# Patient Record
Sex: FEMALE | Race: WHITE | NOT HISPANIC OR LATINO | Employment: FULL TIME | URBAN - METROPOLITAN AREA
[De-identification: names, ages, dates, MRNs, and addresses within clinical notes are randomized per-mention and may not be internally consistent; named-entity substitution may affect disease eponyms.]

---

## 2017-01-03 ENCOUNTER — TRANSFERRED RECORDS (OUTPATIENT)
Dept: HEALTH INFORMATION MANAGEMENT | Facility: CLINIC | Age: 42
End: 2017-01-03

## 2018-12-17 ENCOUNTER — OFFICE VISIT (OUTPATIENT)
Dept: FAMILY MEDICINE | Facility: CLINIC | Age: 43
End: 2018-12-17
Payer: COMMERCIAL

## 2018-12-17 VITALS
TEMPERATURE: 98.2 F | SYSTOLIC BLOOD PRESSURE: 119 MMHG | WEIGHT: 236.31 LBS | HEART RATE: 87 BPM | HEIGHT: 66 IN | BODY MASS INDEX: 37.98 KG/M2 | OXYGEN SATURATION: 97 % | RESPIRATION RATE: 14 BRPM | DIASTOLIC BLOOD PRESSURE: 79 MMHG

## 2018-12-17 DIAGNOSIS — B37.31 CANDIDIASIS OF VAGINA: ICD-10-CM

## 2018-12-17 DIAGNOSIS — Z12.4 SCREENING FOR MALIGNANT NEOPLASM OF CERVIX: ICD-10-CM

## 2018-12-17 DIAGNOSIS — Z00.00 PREVENTATIVE HEALTH CARE: Primary | ICD-10-CM

## 2018-12-17 DIAGNOSIS — B37.9 CANDIDIASIS: ICD-10-CM

## 2018-12-17 LAB
CHOLEST SERPL-MCNC: 228 MG/DL
ERYTHROCYTE [DISTWIDTH] IN BLOOD BY AUTOMATED COUNT: 12.9 % (ref 10–15)
FERRITIN SERPL-MCNC: 89 NG/ML (ref 12–150)
HBA1C MFR BLD: 5.1 % (ref 0–5.6)
HCT VFR BLD AUTO: 42.1 % (ref 35–47)
HDLC SERPL-MCNC: 39 MG/DL
HGB BLD-MCNC: 14.1 G/DL (ref 11.7–15.7)
LDLC SERPL CALC-MCNC: 152 MG/DL
MCH RBC QN AUTO: 30.2 PG (ref 26.5–33)
MCHC RBC AUTO-ENTMCNC: 33.5 G/DL (ref 31.5–36.5)
MCV RBC AUTO: 90 FL (ref 78–100)
NONHDLC SERPL-MCNC: 189 MG/DL
PLATELET # BLD AUTO: 304 10E9/L (ref 150–450)
RBC # BLD AUTO: 4.67 10E12/L (ref 3.8–5.2)
TRIGL SERPL-MCNC: 187 MG/DL
TSH SERPL DL<=0.005 MIU/L-ACNC: 1.28 MU/L (ref 0.4–4)
WBC # BLD AUTO: 9.3 10E9/L (ref 4–11)

## 2018-12-17 PROCEDURE — 82728 ASSAY OF FERRITIN: CPT | Performed by: FAMILY MEDICINE

## 2018-12-17 PROCEDURE — 83036 HEMOGLOBIN GLYCOSYLATED A1C: CPT | Performed by: FAMILY MEDICINE

## 2018-12-17 PROCEDURE — 80061 LIPID PANEL: CPT | Performed by: FAMILY MEDICINE

## 2018-12-17 PROCEDURE — 87624 HPV HI-RISK TYP POOLED RSLT: CPT | Performed by: FAMILY MEDICINE

## 2018-12-17 PROCEDURE — 99396 PREV VISIT EST AGE 40-64: CPT | Performed by: FAMILY MEDICINE

## 2018-12-17 PROCEDURE — 36415 COLL VENOUS BLD VENIPUNCTURE: CPT | Performed by: FAMILY MEDICINE

## 2018-12-17 PROCEDURE — 84443 ASSAY THYROID STIM HORMONE: CPT | Performed by: FAMILY MEDICINE

## 2018-12-17 PROCEDURE — G0145 SCR C/V CYTO,THINLAYER,RESCR: HCPCS | Performed by: FAMILY MEDICINE

## 2018-12-17 PROCEDURE — 85027 COMPLETE CBC AUTOMATED: CPT | Performed by: FAMILY MEDICINE

## 2018-12-17 RX ORDER — CLOTRIMAZOLE 1 G/ML
SOLUTION TOPICAL 2 TIMES DAILY
Qty: 60 ML | Refills: 1 | Status: SHIPPED | OUTPATIENT
Start: 2018-12-17 | End: 2019-05-31

## 2018-12-17 RX ORDER — FLUCONAZOLE 150 MG/1
150 TABLET ORAL ONCE
Qty: 1 TABLET | Refills: 0 | Status: SHIPPED | OUTPATIENT
Start: 2018-12-17 | End: 2019-05-31

## 2018-12-17 ASSESSMENT — MIFFLIN-ST. JEOR: SCORE: 1748.66

## 2018-12-17 ASSESSMENT — PAIN SCALES - GENERAL: PAINLEVEL: NO PAIN (0)

## 2018-12-17 NOTE — NURSING NOTE
"Chief Complaint   Patient presents with     Physical     /79 (BP Location: Right arm, Patient Position: Sitting, Cuff Size: Adult Large)   Pulse 87   Temp 98.2  F (36.8  C) (Oral)   Resp 14   Ht 1.676 m (5' 6\")   Wt 107.2 kg (236 lb 5 oz)   LMP 12/17/2018   SpO2 97%   BMI 38.14 kg/m   Estimated body mass index is 38.14 kg/m  as calculated from the following:    Height as of this encounter: 1.676 m (5' 6\").    Weight as of this encounter: 107.2 kg (236 lb 5 oz).  bp completed using cuff size: large      All.CARITO Esposito                "

## 2018-12-17 NOTE — PROGRESS NOTES
SUBJECTIVE:   CC: Delia Gunn is an 43 year old woman who presents for preventive health visit.     Physical   Annual:     Getting at least 3 servings of Calcium per day:  Yes    Bi-annual eye exam:  NO    Dental care twice a year:  Yes    Sleep apnea or symptoms of sleep apnea:  Daytime drowsiness    Diet:  Regular (no restrictions)    Frequency of exercise:  1 day/week    Duration of exercise:  15-30 minutes    Taking medications regularly:  Yes    Medication side effects:  Not applicable    Additional concerns today:  Yes    PHQ-2 Total Score: 0    The patient is concerned about vaginal and anal symptoms.  She has been using tampon during her menstrual cycle but noticed that the tampon breaks into smaller pieces.  She recently received a message from mother stating that the tampon have been recalled.    Reports itching around the vaginal area as well as the anal area.  Medical history significant for vulvar carcinoma s/p resection.  Patient was was in her 20s at that time.    Also reports excessive fatigue.  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  Unsure if the shift change is causing her fatigue.  Sleeps well at night.  Denies any vaginal bleeding or rectal bleeding.    Today's PHQ-2 Score:   PHQ-2 ( 1999 Pfizer) 12/17/2018   Q1: Little interest or pleasure in doing things 0   Q2: Feeling down, depressed or hopeless 0   PHQ-2 Score 0   Q1: Little interest or pleasure in doing things Not at all   Q2: Feeling down, depressed or hopeless Not at all   PHQ-2 Score 0       Abuse: Current or Past(Physical, Sexual or Emotional)- No  Do you feel safe in your environment? Yes    Social History     Tobacco Use     Smoking status: Never Smoker     Smokeless tobacco: Never Used   Substance Use Topics     Alcohol use: Yes     Alcohol/week: 0.0 oz     Comment: wine on weekends     Alcohol Use 12/17/2018   If you drink alcohol do you typically have greater than 3 drinks per day OR greater than  "7 drinks per week? No   No flowsheet data found.    Reviewed orders with patient.  Reviewed health maintenance and updated orders accordingly - Yes    Mammogram not appropriate for this patient based on age.    Pertinent mammograms are reviewed under the imaging tab.  History of abnormal Pap smear: YES - other categories - see link Cervical Cytology Screening Guidelines  PAP / HPV Latest Ref Rng & Units 4/20/2016 8/19/2014 9/28/2012   PAP - NIL NIL NIL   HPV 16 DNA NEG Negative - -   HPV 18 DNA NEG Negative - -   OTHER HR HPV NEG Negative - -     Reviewed and updated as needed this visit by clinical staff  Tobacco  Allergies  Meds         Review of Systems  CONSTITUTIONAL: NEGATIVE for fever, chills, change in weight  INTEGUMENTARU/SKIN: NEGATIVE for worrisome rashes, moles or lesions  EYES: NEGATIVE for vision changes or irritation  ENT: NEGATIVE for ear, mouth and throat problems  RESP: NEGATIVE for significant cough or SOB  BREAST: NEGATIVE for masses, tenderness or discharge  CV: NEGATIVE for chest pain, palpitations or peripheral edema  GI: NEGATIVE for nausea, abdominal pain, heartburn, or change in bowel habits  : NEGATIVE for unusual urinary or vaginal symptoms. Periods are regular.  MUSCULOSKELETAL: NEGATIVE for significant arthralgias or myalgia  NEURO: NEGATIVE for weakness, dizziness or paresthesias  PSYCHIATRIC: NEGATIVE for changes in mood or affect     OBJECTIVE:   /79 (BP Location: Right arm, Patient Position: Sitting, Cuff Size: Adult Large)   Pulse 87   Temp 98.2  F (36.8  C) (Oral)   Resp 14   Ht 1.676 m (5' 6\")   Wt 107.2 kg (236 lb 5 oz)   LMP 12/17/2018   SpO2 97%   BMI 38.14 kg/m    Physical Exam  GENERAL: healthy, alert and no distress  EYES: Eyes grossly normal to inspection, PERRL and conjunctivae and sclerae normal  HENT: ear canals and TM's normal, nose and mouth without ulcers or lesions  NECK: no adenopathy, no asymmetry, masses, or scars and thyroid normal to " palpation  RESP: lungs clear to auscultation - no rales, rhonchi or wheezes  BREAST: normal without masses, tenderness or nipple discharge and no palpable axillary masses or adenopathy  CV: regular rate and rhythm, normal S1 S2, no S3 or S4, no murmur, click or rub, no peripheral edema and peripheral pulses strong  ABDOMEN: soft, nontender, no hepatosplenomegaly, no masses and bowel sounds normal   (female): normal female external genitalia, normal urethral meatus, moderate thick clumpy vaginal discharge.    Skin around the anal area shows no maceration of the skin consistent with cutaneous candidiasis.  Patient also has a laceration at the 4 o'clock position which can be secondary to candidiasis or straining.  MS: no gross musculoskeletal defects noted, no edema  SKIN: no suspicious lesions or rashes  NEURO: Normal strength and tone, mentation intact and speech normal  PSYCH: mentation appears normal, affect normal/bright        ASSESSMENT/PLAN:       ICD-10-CM    1. Preventative health care Z00.00 TSH with free T4 reflex     CBC with platelets     Ferritin     Lipid panel reflex to direct LDL Fasting     Hemoglobin A1c   2. Candidiasis B37.9 clotrimazole (LOTRIMIN) 1 % external solution   3. Candidiasis of vagina B37.3 fluconazole (DIFLUCAN) 150 MG tablet   4. Screening for malignant neoplasm of cervix Z12.4 Pap imaged thin layer screen with HPV - recommended age 30 - 65 years (select HPV order below)     HPV High Risk Types DNA Cervical     Advised to return to clinic for evaluation if there itching or anal symptoms continues.  Will do wet prep at that time.  Patient declined wet prep today.    COUNSELING:  Reviewed preventive health counseling, as reflected in patient instructions       Regular exercise       Healthy diet/nutrition    Counseling Resources:  ATP IV Guidelines  Pooled Cohorts Equation Calculator  Breast Cancer Risk Calculator  FRAX Risk Assessment  ICSI Preventive Guidelines  Dietary Guidelines  for Americans, 2010  USDA's MyPlate  ASA Prophylaxis  Lung CA Screening    Tg Soni MD  [facility]

## 2018-12-17 NOTE — LETTER
January 3, 2019    Delia Gunn  [address] [address]    Dear Delia,  We are happy to inform you that your PAP smear result from 12/17/18 is normal.  We are now able to do a follow up test on PAP smears. The DNA test is for HPV (Human Papilloma Virus). Cervical cancer is closely linked with certain types of HPV. Your results showed no evidence of high risk HPV.  Therefore we recommend you return in 5 years for your next pap smear and HPV test.  You will still need to return to the clinic every year for an annual exam and other preventive tests.  If you have additional questions regarding this result, please call our registered nurse, Kaila at [phone].  Sincerely,    Tg Soni MD/[facility]

## 2018-12-19 NOTE — RESULT ENCOUNTER NOTE
Please call the patient with the results.     Dear Delia,   All of your results are normal except for your cholesterol panel.     LDL(bad) cholesterol level is elevated, HDL(good) cholesterol level is low and your triglycerides are elevated which can increase your heart disease risk.  A diet high in fat and simple carbohydrates, genetics and being overweight can contribute to this. ADVISE: exercising 150 minutes of aerobic exercise per week (30 minutes for 5 days per week or 50 minutes for 3 days per week are options), eating a low saturated fat/low carbohydrate diet, and omega-3 fatty acids (fish oil) 7325-6178 mg daily are helpful to improve this. Comparing your current results to your results from 2 years ago, we can see a significant increase.      In 6 months, you should recheck your fasting cholesterol panel by scheduling a lab-only appointment.      Tg Soni MD

## 2018-12-20 LAB
COPATH REPORT: NORMAL
PAP: NORMAL

## 2018-12-21 LAB
FINAL DIAGNOSIS: NORMAL
HPV HR 12 DNA CVX QL NAA+PROBE: NEGATIVE
HPV16 DNA SPEC QL NAA+PROBE: NEGATIVE
HPV18 DNA SPEC QL NAA+PROBE: NEGATIVE
SPECIMEN DESCRIPTION: NORMAL
SPECIMEN SOURCE CVX/VAG CYTO: NORMAL

## 2018-12-27 NOTE — RESULT ENCOUNTER NOTE
Remote hx of AIN and CURLY. The patient had cancer surveillance follow-up for a few years with normal results. I would recommend routine screening.       Tg Soni MD

## 2019-03-21 ENCOUNTER — TELEPHONE (OUTPATIENT)
Dept: FAMILY MEDICINE | Facility: CLINIC | Age: 44
End: 2019-03-21

## 2019-03-21 ENCOUNTER — NURSE TRIAGE (OUTPATIENT)
Dept: NURSING | Facility: CLINIC | Age: 44
End: 2019-03-21

## 2019-03-21 DIAGNOSIS — Z29.9 PROPHYLACTIC MEASURE: Primary | ICD-10-CM

## 2019-03-21 RX ORDER — OSELTAMIVIR PHOSPHATE 75 MG/1
75 CAPSULE ORAL DAILY
Qty: 10 CAPSULE | Refills: 0 | Status: SHIPPED | OUTPATIENT
Start: 2019-03-21 | End: 2019-05-31

## 2019-03-21 NOTE — TELEPHONE ENCOUNTER
FS,  Please see below message and advise.  See FNA note if needed also  Thanks,  Fouzia MEJIAS RN

## 2019-03-21 NOTE — TELEPHONE ENCOUNTER
Patient calling back requesting Tamiflu for Influenza. Has called earlier to request the medication and following up if a medication is ordered for her. Informed FNA nurse sent an urgent message to clinic requesting the medication. Informed there is no Tamiflu medication prescribed for patient.     Patient refused triage. States will call back tomorrow. Informed the option of trying [email or web address].    Caller verbalized understanding. Denies further questions.      Jovon Horvath RN  [facility]     Reason for Disposition    Caller requesting a NON-URGENT new prescription or refill and triager unable to refill per unit policy    Protocols used: MEDICATION QUESTION CALL-ADULT-

## 2019-03-21 NOTE — TELEPHONE ENCOUNTER
Patient reports both kids tested positive for the flu and she wants prescription for  Tamiflu.  Patient reports she has a cough and her teeth hurts, no fever yet.  Patient is an RN.  FNA advised will send message to [facility] to consider giving her Tamiflu.        Reason for Disposition    [1] Influenza EXPOSURE within last 72 hours (3 days) AND [2] exposed person is a health care worker, public health worker, or  (EMS)    Additional Information    Negative: Influenza has been diagnosed by a HCP    Negative: Influenza suspected (i.e., fever and respiratory symptoms; probable influenza exposure)    Negative: [1] Influenza EXPOSURE (close contact) within the last 7 days AND [2] fever or any respiratory symptoms (i.e., cough, runny or stuffy nose, sore throat)    Negative: [1] Cough AND [2] begins > 7 days after influenza EXPOSURE    Negative: [1] Runny or stuffy nose AND [2] begins > 7 days after influenza EXPOSURE    Negative: [1] Sore throat AND [2] begins > 7 days after influenza EXPOSURE    Negative: Patient sounds very sick or weak to the triager    Negative: Fever present > 3 days (72 hours)    Negative: [1] Influenza EXPOSURE (Close Contact) within last 72 hours (3 days) AND [2] exposed person is HIGH RISK (e.g., age > 64 years, pregnant, HIV+, chronic medical condition)    Protocols used: INFLUENZA EXPOSURE-ADULT-

## 2019-03-21 NOTE — TELEPHONE ENCOUNTER
Clinic Action Needed: yes, call back  FNA Triage Call  Presenting Problem:  Patient reports both kids tested positive for the flu and she wants prescription for  Tamiflu.  Patient reports she has a cough and her teeth hurts, no fever yet.  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      Guideline Used: flu exposure  Patient Recommendations/Teaching:  Routed to: RN Pool

## 2019-03-21 NOTE — TELEPHONE ENCOUNTER
Clinic Action Needed:Yes  Reason for Call: Patient checking back about getting Tamiflu prophylactically. She does have a cough that started today 3-21-19.  Cell phone [phone].  Uses [facility] [employer] in [place].  Routed to: [facility] triage  Kamilah Webb RN  [facility]

## 2019-03-21 NOTE — TELEPHONE ENCOUNTER
"Clinic Action Needed:Yes  Reason for Call: Patient checking back about getting Tamiflu prophylactically. She does have a cough that started today 3-21-19.  Cell phone [phone].  Uses [facility] pharmacy in [facility] in [place].  Routed to: [facility] triage  Kamilah Webb RN  [facility]      Reason for Disposition    [1] Request for URGENT new prescription or refill of \"essential\" medication (i.e., likelihood of harm to patient if not taken) AND [2] triager unable to fill per unit policy    Protocols used: MEDICATION QUESTION CALL-ADULT-      "

## 2019-05-31 ENCOUNTER — OFFICE VISIT (OUTPATIENT)
Dept: FAMILY MEDICINE | Facility: CLINIC | Age: 44
End: 2019-05-31
Payer: COMMERCIAL

## 2019-05-31 VITALS
WEIGHT: 227 LBS | OXYGEN SATURATION: 97 % | HEART RATE: 84 BPM | HEIGHT: 66 IN | RESPIRATION RATE: 12 BRPM | DIASTOLIC BLOOD PRESSURE: 78 MMHG | TEMPERATURE: 98.4 F | SYSTOLIC BLOOD PRESSURE: 115 MMHG | BODY MASS INDEX: 36.48 KG/M2

## 2019-05-31 DIAGNOSIS — J02.9 SORE THROAT: Primary | ICD-10-CM

## 2019-05-31 LAB
DEPRECATED S PYO AG THROAT QL EIA: NORMAL
SPECIMEN SOURCE: NORMAL

## 2019-05-31 PROCEDURE — 99213 OFFICE O/P EST LOW 20 MIN: CPT | Performed by: PHYSICIAN ASSISTANT

## 2019-05-31 PROCEDURE — 87081 CULTURE SCREEN ONLY: CPT | Performed by: PHYSICIAN ASSISTANT

## 2019-05-31 PROCEDURE — 87880 STREP A ASSAY W/OPTIC: CPT | Performed by: PHYSICIAN ASSISTANT

## 2019-05-31 ASSESSMENT — MIFFLIN-ST. JEOR: SCORE: 1701.42

## 2019-05-31 NOTE — NURSING NOTE
"Chief Complaint   Patient presents with     Pharyngitis     /78 (BP Location: Left arm, Patient Position: Sitting, Cuff Size: Adult Large)   Pulse 84   Temp 98.4  F (36.9  C) (Oral)   Resp 12   Ht 1.676 m (5' 6\")   Wt 103 kg (227 lb)   SpO2 97%   Breastfeeding? No   BMI 36.64 kg/m   Estimated body mass index is 36.64 kg/m  as calculated from the following:    Height as of this encounter: 1.676 m (5' 6\").    Weight as of this encounter: 103 kg (227 lb).  bp completed using cuff size: large       Health Maintenance addressed:  NONE    Possibly completing today per provider review.    Dustin Michael MA       "

## 2019-05-31 NOTE — PROGRESS NOTES
"Subjective     Delia Gunn is a 44 year old female who presents to clinic today for the following health issues:    HPI   RESPIRATORY SYMPTOMS      Duration: started a week ago     Description  sore throat and fatigue/malaise    Severity: moderate    Accompanying signs and symptoms: None    History (predisposing factors):  strep exposure at school (job)    Precipitating or alleviating factors: None    Therapies tried and outcome:  Cough drops, chloropeptic spray and salt water raises           BP Readings from Last 3 Encounters:   05/31/19 115/78   12/17/18 119/79   11/07/16 119/84    Wt Readings from Last 3 Encounters:   05/31/19 103 kg (227 lb)   12/17/18 107.2 kg (236 lb 5 oz)   11/07/16 91.6 kg (202 lb)                      Reviewed and updated as needed this visit by Provider         Review of Systems   ROS COMP: Constitutional, HEENT, cardiovascular, pulmonary, gi and gu systems are negative, except as otherwise noted.      Objective    /78 (BP Location: Left arm, Patient Position: Sitting, Cuff Size: Adult Large)   Pulse 84   Temp 98.4  F (36.9  C) (Oral)   Resp 12   Ht 1.676 m (5' 6\")   Wt 103 kg (227 lb)   SpO2 97%   Breastfeeding? No   BMI 36.64 kg/m    Body mass index is 36.64 kg/m .  Physical Exam   GENERAL: alert and no distress  EYES: Eyes grossly normal to inspection  HENT: ear canals and TM's normal, nose and mouth without ulcers or lesions  NECK: no adenopathy, no asymmetry, masses, or scars and thyroid normal to palpation  RESP: lungs clear to auscultation - no rales, rhonchi or wheezes  CV: regular rate and rhythm, normal S1 S2, no S3 or S4, no murmur, click or rub, no peripheral edema and peripheral pulses strong  PSYCH: mentation appears normal, affect normal/bright    Diagnostic Test Results:  Labs reviewed in Epic  Results for orders placed or performed in visit on 05/31/19 (from the past 24 hour(s))   Strep, Rapid Screen   Result Value Ref Range    Specimen Description " "Throat     Rapid Strep A Screen       NEGATIVE: No Group A streptococcal antigen detected by immunoassay, await culture report.           Assessment & Plan     1. Sore throat  Most likely viral vs allergies.  Supportive care.  - Strep, Rapid Screen  - Beta strep group A culture     BMI:   Estimated body mass index is 36.64 kg/m  as calculated from the following:    Height as of this encounter: 1.676 m (5' 6\").    Weight as of this encounter: 103 kg (227 lb).               No follow-ups on file.    Erik Pena PA-C  [facility]        "

## 2019-06-01 LAB
BACTERIA SPEC CULT: NORMAL
SPECIMEN SOURCE: NORMAL

## 2023-02-02 ENCOUNTER — HOSPITAL ENCOUNTER (OUTPATIENT)
Dept: MAMMOGRAPHY | Facility: CLINIC | Age: 48
Discharge: HOME | End: 2023-02-02
Attending: FAMILY MEDICINE | Admitting: FAMILY MEDICINE
Payer: COMMERCIAL

## 2023-02-02 DIAGNOSIS — Z12.31 VISIT FOR SCREENING MAMMOGRAM: ICD-10-CM

## 2023-02-02 PROCEDURE — 77067 SCR MAMMO BI INCL CAD: CPT

## 2023-02-22 ENCOUNTER — OFFICE VISIT (OUTPATIENT)
Dept: FAMILY MEDICINE | Facility: CLINIC | Age: 48
End: 2023-02-22
Payer: COMMERCIAL

## 2023-02-22 VITALS
DIASTOLIC BLOOD PRESSURE: 76 MMHG | HEIGHT: 65 IN | WEIGHT: 227 LBS | OXYGEN SATURATION: 100 % | BODY MASS INDEX: 37.82 KG/M2 | HEART RATE: 77 BPM | SYSTOLIC BLOOD PRESSURE: 111 MMHG | RESPIRATION RATE: 16 BRPM | TEMPERATURE: 97.7 F

## 2023-02-22 DIAGNOSIS — N89.8 VAGINAL LESION: ICD-10-CM

## 2023-02-22 DIAGNOSIS — D07.1 VIN III (VULVAR INTRAEPITHELIAL NEOPLASIA III): ICD-10-CM

## 2023-02-22 DIAGNOSIS — Z23 NEED FOR DIPHTHERIA-TETANUS-PERTUSSIS (TDAP) VACCINE: ICD-10-CM

## 2023-02-22 DIAGNOSIS — Z11.59 NEED FOR HEPATITIS C SCREENING TEST: ICD-10-CM

## 2023-02-22 DIAGNOSIS — Z00.00 ROUTINE GENERAL MEDICAL EXAMINATION AT A HEALTH CARE FACILITY: Primary | ICD-10-CM

## 2023-02-22 DIAGNOSIS — K62.82 ANAL INTRAEPITHELIAL NEOPLASIA II (AIN II): ICD-10-CM

## 2023-02-22 DIAGNOSIS — Z12.11 ENCOUNTER FOR SCREENING FOR MALIGNANT NEOPLASM OF COLON: ICD-10-CM

## 2023-02-22 DIAGNOSIS — E78.5 HYPERLIPIDEMIA LDL GOAL <100: ICD-10-CM

## 2023-02-22 DIAGNOSIS — Z12.11 SCREEN FOR COLON CANCER: ICD-10-CM

## 2023-02-22 DIAGNOSIS — Z12.4 SCREENING FOR MALIGNANT NEOPLASM OF CERVIX: ICD-10-CM

## 2023-02-22 DIAGNOSIS — L65.9 LOSS OF HAIR: ICD-10-CM

## 2023-02-22 LAB
ALBUMIN SERPL BCG-MCNC: 4.7 G/DL (ref 3.5–5.2)
ALP SERPL-CCNC: 59 U/L (ref 35–104)
ALT SERPL W P-5'-P-CCNC: 20 U/L (ref 10–35)
ANION GAP SERPL CALCULATED.3IONS-SCNC: 17 MMOL/L (ref 7–15)
AST SERPL W P-5'-P-CCNC: 23 U/L (ref 10–35)
BASOPHILS # BLD AUTO: 0 10E3/UL (ref 0–0.2)
BASOPHILS NFR BLD AUTO: 0 %
BILIRUB SERPL-MCNC: 0.6 MG/DL
BUN SERPL-MCNC: 9.6 MG/DL (ref 6–20)
CALCIUM SERPL-MCNC: 9.9 MG/DL (ref 8.6–10)
CHLORIDE SERPL-SCNC: 104 MMOL/L (ref 98–107)
CHOLEST SERPL-MCNC: 278 MG/DL
CREAT SERPL-MCNC: 0.78 MG/DL (ref 0.51–0.95)
DEPRECATED HCO3 PLAS-SCNC: 20 MMOL/L (ref 22–29)
EOSINOPHIL # BLD AUTO: 0.2 10E3/UL (ref 0–0.7)
EOSINOPHIL NFR BLD AUTO: 2 %
ERYTHROCYTE [DISTWIDTH] IN BLOOD BY AUTOMATED COUNT: 13.4 % (ref 10–15)
FERRITIN SERPL-MCNC: 153 NG/ML (ref 6–175)
GFR SERPL CREATININE-BSD FRML MDRD: >90 ML/MIN/1.73M2
GLUCOSE SERPL-MCNC: 88 MG/DL (ref 70–99)
HBA1C MFR BLD: 5.4 % (ref 0–5.6)
HCT VFR BLD AUTO: 42.8 % (ref 35–47)
HDLC SERPL-MCNC: 37 MG/DL
HGB BLD-MCNC: 14.7 G/DL (ref 11.7–15.7)
LDLC SERPL CALC-MCNC: 196 MG/DL
LYMPHOCYTES # BLD AUTO: 3 10E3/UL (ref 0.8–5.3)
LYMPHOCYTES NFR BLD AUTO: 36 %
MCH RBC QN AUTO: 30.2 PG (ref 26.5–33)
MCHC RBC AUTO-ENTMCNC: 34.3 G/DL (ref 31.5–36.5)
MCV RBC AUTO: 88 FL (ref 78–100)
MONOCYTES # BLD AUTO: 0.4 10E3/UL (ref 0–1.3)
MONOCYTES NFR BLD AUTO: 5 %
NEUTROPHILS # BLD AUTO: 4.6 10E3/UL (ref 1.6–8.3)
NEUTROPHILS NFR BLD AUTO: 57 %
NONHDLC SERPL-MCNC: 241 MG/DL
PLATELET # BLD AUTO: 345 10E3/UL (ref 150–450)
POTASSIUM SERPL-SCNC: 4.2 MMOL/L (ref 3.4–5.3)
PROT SERPL-MCNC: 7.4 G/DL (ref 6.4–8.3)
RBC # BLD AUTO: 4.87 10E6/UL (ref 3.8–5.2)
SODIUM SERPL-SCNC: 141 MMOL/L (ref 136–145)
TRIGL SERPL-MCNC: 224 MG/DL
TSH SERPL DL<=0.005 MIU/L-ACNC: 1.1 UIU/ML (ref 0.3–4.2)
VIT B12 SERPL-MCNC: 479 PG/ML (ref 232–1245)
WBC # BLD AUTO: 8.2 10E3/UL (ref 4–11)

## 2023-02-22 PROCEDURE — 84403 ASSAY OF TOTAL TESTOSTERONE: CPT | Performed by: FAMILY MEDICINE

## 2023-02-22 PROCEDURE — 80061 LIPID PANEL: CPT | Performed by: FAMILY MEDICINE

## 2023-02-22 PROCEDURE — G0145 SCR C/V CYTO,THINLAYER,RESCR: HCPCS | Performed by: FAMILY MEDICINE

## 2023-02-22 PROCEDURE — 80050 GENERAL HEALTH PANEL: CPT | Performed by: FAMILY MEDICINE

## 2023-02-22 PROCEDURE — 86803 HEPATITIS C AB TEST: CPT | Performed by: FAMILY MEDICINE

## 2023-02-22 PROCEDURE — 83036 HEMOGLOBIN GLYCOSYLATED A1C: CPT | Performed by: FAMILY MEDICINE

## 2023-02-22 PROCEDURE — 82306 VITAMIN D 25 HYDROXY: CPT | Performed by: FAMILY MEDICINE

## 2023-02-22 PROCEDURE — 86038 ANTINUCLEAR ANTIBODIES: CPT | Performed by: FAMILY MEDICINE

## 2023-02-22 PROCEDURE — 99213 OFFICE O/P EST LOW 20 MIN: CPT | Mod: 25 | Performed by: FAMILY MEDICINE

## 2023-02-22 PROCEDURE — 90471 IMMUNIZATION ADMIN: CPT | Performed by: FAMILY MEDICINE

## 2023-02-22 PROCEDURE — 99386 PREV VISIT NEW AGE 40-64: CPT | Mod: 25 | Performed by: FAMILY MEDICINE

## 2023-02-22 PROCEDURE — 82728 ASSAY OF FERRITIN: CPT | Performed by: FAMILY MEDICINE

## 2023-02-22 PROCEDURE — 90715 TDAP VACCINE 7 YRS/> IM: CPT | Performed by: FAMILY MEDICINE

## 2023-02-22 PROCEDURE — 82607 VITAMIN B-12: CPT | Performed by: FAMILY MEDICINE

## 2023-02-22 PROCEDURE — 87624 HPV HI-RISK TYP POOLED RSLT: CPT | Performed by: FAMILY MEDICINE

## 2023-02-22 PROCEDURE — 84270 ASSAY OF SEX HORMONE GLOBUL: CPT | Performed by: FAMILY MEDICINE

## 2023-02-22 PROCEDURE — 36415 COLL VENOUS BLD VENIPUNCTURE: CPT | Performed by: FAMILY MEDICINE

## 2023-02-22 ASSESSMENT — ENCOUNTER SYMPTOMS
ARTHRALGIAS: 0
HEMATOCHEZIA: 0
PARESTHESIAS: 0
FREQUENCY: 0
MYALGIAS: 0
HEADACHES: 0
SHORTNESS OF BREATH: 0
DYSURIA: 0
CHILLS: 0
COUGH: 0
ABDOMINAL PAIN: 0
JOINT SWELLING: 0
EYE PAIN: 0
BREAST MASS: 0
HEARTBURN: 0
FEVER: 0
SORE THROAT: 0
PALPITATIONS: 0
DIZZINESS: 0
NAUSEA: 0
WEAKNESS: 0
CONSTIPATION: 0
HEMATURIA: 0
NERVOUS/ANXIOUS: 0
DIARRHEA: 0

## 2023-02-22 NOTE — PROGRESS NOTES
SUBJECTIVE:   CC: Delia is an 47 year old who presents for preventive health visit.       Healthy Habits:     Getting at least 3 servings of Calcium per day:  Yes    Bi-annual eye exam:  NO    Dental care twice a year:  Yes    Sleep apnea or symptoms of sleep apnea:  None    Diet:  Regular (no restrictions)    Frequency of exercise:  1 day/week    Duration of exercise:  15-30 minutes    Taking medications regularly:  Not Applicable    Medication side effects:  Not applicable    PHQ-2 Total Score: 0    Additional concerns today:  No          Today's PHQ-2 Score:   PHQ-2 ( 1999 Pfizer) 2/22/2023   Q1: Little interest or pleasure in doing things 0   Q2: Feeling down, depressed or hopeless 0   PHQ-2 Score 0   PHQ-2 Total Score (12-17 Years)- Positive if 3 or more points; Administer PHQ-A if positive -   Q1: Little interest or pleasure in doing things Not at all   Q2: Feeling down, depressed or hopeless Not at all   PHQ-2 Score 0         Social History     Tobacco Use     Smoking status: Never     Smokeless tobacco: Never   Substance Use Topics     Alcohol use: Yes     Alcohol/week: 0.0 standard drinks     Comment: wine on weekends     If you drink alcohol do you typically have >3 drinks per day or >7 drinks per week? No    Alcohol Use 2/22/2023   Prescreen: >3 drinks/day or >7 drinks/week? No   Prescreen: >3 drinks/day or >7 drinks/week? -   No flowsheet data found.    Reviewed orders with patient.  Reviewed health maintenance and updated orders accordingly - Yes    Breast Cancer Screening:    FHS-7:   Breast CA Risk Assessment (FHS-7) 2/2/2023 2/22/2023   Did any of your first-degree relatives have breast or ovarian cancer? Yes Yes   Did any of your relatives have bilateral breast cancer? No No   Did any man in your family have breast cancer? No No   Did any woman in your family have breast and ovarian cancer? No No   Did any woman in your family have breast cancer before age 50 y? No No   Do you have 2 or more  "relatives with breast and/or ovarian cancer? No No   Do you have 2 or more relatives with breast and/or bowel cancer? No No       History of abnormal Pap smear: YES - other categories - see link Cervical Cytology Screening Guidelines.  Patient is high risk. Had AIN II and CURLY III. Needs to have annual pelvic exams.    PAP / HPV Latest Ref Rng & Units 12/17/2018 4/20/2016 8/19/2014   PAP (Historical) - NIL NIL NIL   HPV16 NEG:Negative Negative Negative -   HPV18 NEG:Negative Negative Negative -   HRHPV NEG:Negative Negative Negative -     Reviewed and updated as needed this visit by clinical staff   Tobacco  Allergies  Meds  Problems  Med Hx  Surg Hx  Fam Hx          Reviewed and updated as needed this visit by Provider   Tobacco  Allergies  Meds  Problems  Med Hx  Surg Hx  Fam Hx         Review of Systems   Constitutional: Negative for chills and fever.   HENT: Negative for congestion, ear pain, hearing loss and sore throat.    Eyes: Negative for pain and visual disturbance.   Respiratory: Negative for cough and shortness of breath.    Cardiovascular: Negative for chest pain, palpitations and peripheral edema.   Gastrointestinal: Negative for abdominal pain, constipation, diarrhea, heartburn, hematochezia and nausea.   Breasts:  Positive for tenderness. Negative for breast mass and discharge.   Genitourinary: Negative for dysuria, frequency, genital sores, hematuria, pelvic pain, urgency, vaginal bleeding and vaginal discharge.   Musculoskeletal: Negative for arthralgias, joint swelling and myalgias.   Skin: Negative for rash.   Neurological: Negative for dizziness, weakness, headaches and paresthesias.   Psychiatric/Behavioral: Negative for mood changes. The patient is not nervous/anxious.       OBJECTIVE:   /76   Pulse 77   Temp 97.7  F (36.5  C) (Temporal)   Resp 16   Ht 1.638 m (5' 4.5\")   Wt 103 kg (227 lb)   LMP 02/07/2023   SpO2 100%   BMI 38.36 kg/m    Physical Exam  GENERAL: " healthy, alert and no distress  EYES: Eyes grossly normal to inspection, PERRL and conjunctivae and sclerae normal  HENT: ear canals and TM's normal, nose and mouth without ulcers or lesions  NECK: no adenopathy, no asymmetry, masses, or scars and thyroid normal to palpation  RESP: lungs clear to auscultation - no rales, rhonchi or wheezes  BREAST: normal without masses, tenderness or nipple discharge and no palpable axillary masses or adenopathy  CV: regular rate and rhythm, normal S1 S2, no S3 or S4, no murmur, click or rub, no peripheral edema and peripheral pulses strong  ABDOMEN: soft, nontender, no hepatosplenomegaly, no masses and bowel sounds normal   (female): normal female external genitalia, normal urethral meatus, vaginal mucosa pink but  Has an irregular surface contour. hx of CURLY III and Anal intraepithelial neoplasia. needs a vaginoscopy/Colposcopy of the Vagina.  MS: no gross musculoskeletal defects noted, no edema  SKIN: no suspicious lesions or rashes  NEURO: Normal strength and tone, mentation intact and speech normal  PSYCH: mentation appears normal, affect normal/bright    Diagnostic Test Results:  Labs reviewed in Epic    ASSESSMENT/PLAN:     Delia was seen today for physical.    Diagnoses and all orders for this visit:    Routine general medical examination at a health care facility  -     Lipid panel reflex to direct LDL Fasting; Future  -     Hemoglobin A1c; Future    Screen for colon cancer    Need for hepatitis C screening test  -     Hepatitis C Screen Reflex to HCV RNA Quant and Genotype; Future      Anal intraepithelial neoplasia II (AIN II)  In 2011, the patient had a lesion in the anal area. bx by her OBGYN revealed a high-grade dysplasia.  She had a high resolution anoscopy by colorectal which revealed AIN-1.  She was treated with Aldara at that time.  No follow-up since then.  -Physical examination did not reveal any anal lesions at this time.    CURLY III (vulvar intraepithelial  neoplasia III)  Vaginal lesion  Initial evaluation was in 2003. vulvar lesion which was biopsied by Gyn Onc revealed  CURLY-III.  She underwent a local excision in February 2003.  In February 2004, she had an additional biopsy that showed CURLY-II and was placed on Aldara.  Vaginal examination today revealed multiple vaginal wall irregularities concerning for Vaginal intraepithelial neoplasia. Patient was referred to gyn for Vaginal Colposcopy and possible biopsy.     Need for diphtheria-tetanus-pertussis (Tdap) vaccine  -     TDAP VACCINE (Adacel, Boostrix)    Loss of hair  -     Anti Nuclear Marilee IgG by IFA with Reflex; Future  -     Testosterone Free and Total; Future  -     Vitamin B12; Future  -     Ferritin; Future  -     CBC with Platelets & Differential; Future  -     Comprehensive metabolic panel; Future  -     Vitamin D Deficiency; Future  -     TSH with free T4 reflex; Future    Encounter for screening for malignant neoplasm of colon  -     Colonoscopy Screening  Referral; Future    Screening for malignant neoplasm of cervix  -     Pap screen with HPV - recommended age 30 - 65 years    Other orders  -     REVIEW OF HEALTH MAINTENANCE PROTOCOL ORDERS    Patient has been advised of split billing requirements and indicates understanding: Yes      COUNSELING:  Reviewed preventive health counseling, as reflected in patient instructions       Regular exercise       Healthy diet/nutrition        She reports that she has never smoked. She has never used smokeless tobacco.          Tg Soni MD  [facility]

## 2023-02-23 LAB
ANA SER QL IF: NEGATIVE
DEPRECATED CALCIDIOL+CALCIFEROL SERPL-MC: 25 UG/L (ref 20–75)
HCV AB SERPL QL IA: NONREACTIVE
SHBG SERPL-SCNC: 27 NMOL/L (ref 30–135)

## 2023-02-24 LAB
TESTOST FREE SERPL-MCNC: 0.38 NG/DL
TESTOST SERPL-MCNC: 19 NG/DL (ref 8–60)

## 2023-02-24 NOTE — RESULT ENCOUNTER NOTE
Dear Delia,   - Your cholesterol panel is abnormal. Your LDL is significantly elevated.  To confirm the validity of the results, I would recommend rechecking your cholesterol in 1 week.  You must be fasting for 8 hours prior to your test.  - Your carbon dioxide is slightly low and your anion gap is mildly elevated.  This is a sign of dehydration.  Increase your oral hydration.  - Low SHBG is one of the early markers for type 2 diabetes, nonalcoholic fatty liver disease and polycystic ovarian syndrome.  Based on your menstrual history and your lab results you do not have PCOS nor type 2 diabetes.   Weight loss is highly recommended.    Best Regards  Tg Soni MD

## 2023-02-27 LAB
BKR LAB AP GYN ADEQUACY: NORMAL
BKR LAB AP GYN INTERPRETATION: NORMAL
BKR LAB AP GYN OTHER FINDINGS: NORMAL
BKR LAB AP HPV REFLEX: NORMAL
BKR LAB AP LMP: NORMAL
BKR LAB AP PREVIOUS ABNORMAL: NORMAL
PATH REPORT.COMMENTS IMP SPEC: NORMAL
PATH REPORT.COMMENTS IMP SPEC: NORMAL
PATH REPORT.RELEVANT HX SPEC: NORMAL

## 2023-03-01 LAB
HUMAN PAPILLOMA VIRUS 16 DNA: NEGATIVE
HUMAN PAPILLOMA VIRUS 18 DNA: NEGATIVE
HUMAN PAPILLOMA VIRUS FINAL DIAGNOSIS: ABNORMAL
HUMAN PAPILLOMA VIRUS OTHER HR: POSITIVE

## 2023-03-02 ENCOUNTER — PATIENT OUTREACH (OUTPATIENT)
Dept: FAMILY MEDICINE | Facility: CLINIC | Age: 48
End: 2023-03-02
Payer: COMMERCIAL

## 2023-05-02 ENCOUNTER — TELEPHONE (OUTPATIENT)
Dept: GASTROENTEROLOGY | Facility: CLINIC | Age: 48
End: 2023-05-02
Payer: COMMERCIAL

## 2023-05-02 NOTE — TELEPHONE ENCOUNTER
Screening Questions  BLUE  KIND OF PREP RED  LOCATION [review exclusion criteria] GREEN  SEDATION TYPE    Y  Are you active on mychart?   Tg Soni MD  Ordering/Referring Provider?    [facility]  What type of coverage do you have?  N  Have you had a positive covid test in the last 14 days?    34.7  1. BMI  [BMI 40+ - review exclusion criteria - MAC + [facility]]            *NEED PAC APPT AT [facility] + [facility] (ONLY)*     SELF   2. Are you able to give consent for your medical care? [IF NO,RN REVIEW]          N  3. Are you taking any prescription pain medications on a routine schedule   (ex narcotics: tramadol, oxycodone, roxicodone, oxycontin,  and percocet)?    [RN Review]             N  3a. EXTENDED PREP What kind of prescription?     N 4. Do you have any chemical dependencies such as alcohol, street drugs, or methadone?    **If yes 3- 5 , please schedule with MAC sedation.**          IF YES TO ANY 6 - 10 - HOSPITAL SETTING ONLY.     N 6.   Do you need assistance transferring?     N 7.   Have you had a heart or lung transplant?    N 8.   Are you currently on dialysis?   N 9.   Do you use daily home oxygen?   N 10. Do you take nitroglycerin?   10a. N If yes, how often?     11. [FEMALES]   Are you currently pregnant?     11a.  If yes, how many weeks? [ Greater than 12 weeks, OR NEEDED]    N 12. [review exclusion criteria]  Do you have any implantable devices in your body (pacemaker, defib, LVAD)?            *NEED PAC APPT AT [facility]*     N 13. Do you have Pulmonary Hypertension?             *NEED PAC APPT AT [facility]*     N 14. In the past 6 months, have you had any heart related issues including cardiomyopathy or heart attack?     N 14a. If yes, did it require cardiac stenting if so when?     N 15. Have you had a stroke or Transient ischemic attack (TIA - aka  mini stroke ) within 6 months?      N 16. Do you have mod to severe Obstructive Sleep Apnea?  [Hospital only]    N 17. Do you have SEVERE AND UNCONTROLLED asthma?           "    *NEED PAC APPT AT [facility]*     N 18. Are you currently taking any blood thinners?     18a. No. Continue to 19.   18b. Yes/no Blood Thinner: No [CONTINUE TO #19]    N 19. Do you take the medication Phentermine?    19a. If yes, \"Hold for 7 days before procedure.  Please consult your prescribing provider if you have questions about holding this medication.\"     N  20. Do you have chronic kidney disease?      N  21. Do you have a diagnosis of diabetes?     N  22. On a regular basis do you go 3-5 days between bowel movements?     23. Preferred LOCAL Pharmacy for Pre Prescription    [ LIST ONLY ONE PHARMACY]     [id number] [address]        - CLOSING REMINDERS -    Informed patient they will need an adult          Cannot take any type of public or medical transportation alone    Conscious Sedation- Needs  for 6 hours after the procedure        MAC/General-Needs  for 24 hours after procedure    Pre-Procedure Covid test to be completed         [[facility] PCR/HOME Testing Required] + ADULT to ACCOMPANY     Confirmed Nurse will call to complete assessment       - SCHEDULING DETAILS -      Hospital Setting Required? If yes, what is the exclusion?:  N      Additional comments:  PT WILL TALK TO INSURANCE AND FIND OUT ABOUT COVERAGE FIRST     **PT WILL CALL BACK TO COMPLETE MAITE CRUZ             "

## 2023-05-12 ENCOUNTER — TELEPHONE (OUTPATIENT)
Dept: GASTROENTEROLOGY | Facility: CLINIC | Age: 48
End: 2023-05-12
Payer: COMMERCIAL

## 2023-05-12 NOTE — TELEPHONE ENCOUNTER
Screening questions completed 5/2.        - CLOSING REMINDERS -    You will receive a call from a Nurse to review instructions and health history.  This assessment must be completed prior to your procedure.  Failure to complete the Nurse assessment may result in the procedure being cancelled.      On the day of your procedure, please designatean adult(s) who can drive you home stay with you for the next 24 hours. The medicines used in the exam will make you sleepy. You will not be able to drive.      You cannot take public transportation, ride share services, or non-medical taxi service without a responsible caregiver.  Medical transport services are allowed with the requirement that a responsible caregiver will receive you at your destination.  We require that drivers and caregivers are confirmed prior to your procedure.      - SCHEDULING DETAILS -  NO &  Hospital Setting Required & If yes, what is the exclusion?   GAERTNER  Surgeon    6/14  Date of Procedure  Lower Endoscopy [Colonoscopy]  Type of Procedure [facility]uled  [facility]   MIRALAX GATORADE WITHOUT MAGNEISUM CITRATE Which Colonoscopy Prep was Sent?     MAC, PER LOCATION Sedation Type     N PAC / Pre-op Required

## 2023-05-17 ENCOUNTER — PATIENT OUTREACH (OUTPATIENT)
Dept: ONCOLOGY | Facility: CLINIC | Age: 48
End: 2023-05-17

## 2023-05-17 ENCOUNTER — OFFICE VISIT (OUTPATIENT)
Dept: OBGYN | Facility: CLINIC | Age: 48
End: 2023-05-17
Payer: COMMERCIAL

## 2023-05-17 VITALS
SYSTOLIC BLOOD PRESSURE: 118 MMHG | DIASTOLIC BLOOD PRESSURE: 82 MMHG | HEIGHT: 66 IN | BODY MASS INDEX: 36.48 KG/M2 | WEIGHT: 227 LBS | OXYGEN SATURATION: 99 % | HEART RATE: 88 BPM

## 2023-05-17 DIAGNOSIS — N89.8 VAGINAL ODOR: ICD-10-CM

## 2023-05-17 DIAGNOSIS — N89.3 VAIN (VAGINAL INTRAEPITHELIAL NEOPLASIA): Primary | ICD-10-CM

## 2023-05-17 DIAGNOSIS — N89.8 VAGINAL ODOR: Primary | ICD-10-CM

## 2023-05-17 LAB
CLUE CELLS: ABNORMAL
TRICHOMONAS, WET PREP: ABNORMAL
WBC'S/HIGH POWER FIELD, WET PREP: ABNORMAL
YEAST, WET PREP: ABNORMAL

## 2023-05-17 PROCEDURE — 57100 BIOPSY VAGINAL MUCOSA SIMPLE: CPT | Performed by: OBSTETRICS & GYNECOLOGY

## 2023-05-17 PROCEDURE — 99203 OFFICE O/P NEW LOW 30 MIN: CPT | Mod: 25 | Performed by: OBSTETRICS & GYNECOLOGY

## 2023-05-17 PROCEDURE — 88305 TISSUE EXAM BY PATHOLOGIST: CPT | Performed by: PATHOLOGY

## 2023-05-17 PROCEDURE — 87210 SMEAR WET MOUNT SALINE/INK: CPT | Performed by: OBSTETRICS & GYNECOLOGY

## 2023-05-17 NOTE — PROGRESS NOTES
"GYN Progress Note     CC: Vaginal dysplasia     HPI: Delia Gunn is a 47 YO  with a history of VAIN, CURLY, JEREMY and anal dysplasia who presents for follow up for vaginal lesion. She previously was seen by GYN oncology for dyplasia in the past and was under the impression that they were seeing GYN Onc today but due to a scheduling issue, she was scheduled with benign GYN. Regarding her pap smears, She had NIL paps in ,  and 2018 but her most recent was NIL with positive +HR HP other and she is due for a repeat in 2024.     She presents today due to concern that the VAIN has recurred. She can feel a \"bumpy area\" just inside the vaginal opening at 12 o'clock. She denies abnormal vaginal bleeding and denies any vulvar itching/irritation or concerning lesions. She has noticed some perianal irritation and is concerned that the anal dysplasia has returned. She does have a vaginal odor that is bothersome to her but denies any abnormal discharge or irritation.     O: Vital signs:      BP: 118/82 Pulse: 88     SpO2: 99 %     Height: 167.6 cm (5' 6\") Weight: 103 kg (227 lb)  Estimated body mass index is 36.64 kg/m  as calculated from the following:    Height as of this encounter: 1.676 m (5' 6\").    Weight as of this encounter: 103 kg (227 lb).      General: Patient alert and oriented, no acute distress  CV: no peripheral edema or cyanosis  Resp: normal respiratory effort and equal lung expansion  : normal external genitalia without lesions, no lesions concerning for CURLY on gross exam. Urethral meatus normal, urethra and bladder non-tender to palpation. Vaginal mucosa normal in appearance without lesions, with small area of the vaginal mucosa that appears whitish and thickened 1 cm inside the introitus at 12 o'clock (under urethra). Cervix appears grossly normal. Perineum and anus appear normal to gross exam.   Ext: non-tender, no edema    Vaginal biopsy procedure   After patient's name, allergies and " procedure were verified the area of the vaginal mucosa was prepped with betadine and injected with 3 mL of 1% lidocaine. Pickups and scissors were then used to take a small tissue biopsy of the affected area of the vagina and hemostasis achieved with silver nitrate. EBL <5 mL. Patient tolerated the procedure well with no apparent complications.     Component      Latest Ref Rng 5/17/2023  1:15 PM   Trichomonas      Absent  Absent    Yeast      Absent  Absent    Clue cells      Absent  Absent    WBCs/high power field      None  1+ !       Legend:  ! 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 Given her concern for possible recurrence of CURLY/AIN I would also recommend that she be evaluated in the GYN/ONC dysplasia clinic but no gross lesions present on exam.   Plan: Adult Oncology/Hematology  Referral,         Surgical Pathology Exam, Adult         Oncology/Hematology  Referral            (N89.8) Vaginal odor  -Wet prep negative for BV, normal pelvic exam   -Discussed balance of normal vaginal ally and apocrine sweat glands of the vulva/groin that to odor and that while there are commercial products to mask this, they can predispose patients to vaginal/vuvlar irritation or vaginal infections so we would advise that she refrain from using but should continue washing daily with a mild soap and water.     Dispo: RTC as needed     Dorothy Chaves MD

## 2023-05-17 NOTE — PROGRESS NOTES
"New Patient Hematology / Oncology Nurse Navigator Note     Referral Date: 5/17/23    Referring provider:   Dorothy Chaves MD   [address]   [facility] [address]   Phone: [phone]   Fax: [phone]       Referring Clinic/Organization: [facility]     Referred to: GynOnc    Requested provider (if applicable): [facility]    Evaluation for : \"Treatment/management of AIN, CURLY and VAIN, please schedule in dysplasia clinic\"     Clinical History (per Nurse review of records provided):      Office Visit today with OBGYN -- BOOKMARKED    Path collected today, pending -- BOOKMARKED    2/22/23 PAP/HPV   Component Ref Range & Units      Other HR HPV Negative Positive Abnormal      HPV16 DNA Negative Negative     HPV18 DNA Negative Negative     FINAL DIAGNOSIS     -- BOOKMARKED  6/8/22 path:  FINAL DIAGNOSIS:  A.  Vulva, 6:00, biopsy:       - Genital lentiginosis.       - No evidence of dysplasia  B.  Skin, kulwinder-anal at 4:00, biopsy:       - Focal high grade squamous intraepithelial lesion (AIN-II).       - Koilocytosis.    Clinical Assessment / Barriers to Care (Per Nurse):  Pt lives in [place], [place]     Records Location: [facility]     Records Needed:   N/A    Additional testing needed prior to consult:   N/A, path from today pending    Referral updates and Plan:   OUTGOING CALL to pt:  Introduced my role as nurse navigator with [facility] dept and that we have recd the referral to Dr. [facility] from Dr Chaves.  Pt confirms they are aware of the referral and ready to schedule. [place] location is preferred. Discussed 6/15 which works for pt.   Provided contact information if future questions arise.     -Transferred pt to NPS line [phone] to schedule appt per scheduling instructions.    Aye Alcaraz, FAWNN, RN, PHN, OCN  Hematology/Oncology Nurse Navigator  [facility]  [phone]      "

## 2023-05-21 LAB
PATH REPORT.COMMENTS IMP SPEC: NORMAL
PATH REPORT.COMMENTS IMP SPEC: NORMAL
PATH REPORT.FINAL DX SPEC: NORMAL
PATH REPORT.GROSS SPEC: NORMAL
PATH REPORT.MICROSCOPIC SPEC OTHER STN: NORMAL
PATH REPORT.RELEVANT HX SPEC: NORMAL
PHOTO IMAGE: NORMAL

## 2023-05-31 ENCOUNTER — TELEPHONE (OUTPATIENT)
Dept: GASTROENTEROLOGY | Facility: CLINIC | Age: 48
End: 2023-05-31
Payer: COMMERCIAL

## 2023-05-31 NOTE — TELEPHONE ENCOUNTER
Pre assessment questions completed for upcoming Colonoscopy  procedure scheduled on 6.14.2023    COVID policy reviewed.     Pre-op exam? N/A    Reviewed procedural arrival time 0730, procedure time 0830 and facility location [facility]; [address], [place] [address]    Designated  policy reviewed. Instructed to have someone stay 24 hours post procedure.     NSAIDs? Yes.  Ibuprofen (Advil, Motrin).  Holding interval of 1 day before procedure.    Anticoagulation/blood thinners? No    Electronic implanted devices? No    Diabetic? No    Procedure indication: screening colonoscopy    Bowel prep recommendation: Miralax prep without magnesium citrate    Reviewed procedure prep instructions.     Prep instructions sent via [employer].     Patient verbalized understanding and had no questions or concerns at this time.    Shelby Bowen RN  Endoscopy Procedure Pre Assessment RN

## 2023-06-13 NOTE — PROGRESS NOTES
Consult Note on Referred Patient  Gynecologic Oncology HPV Clinic  [facility]      Date: 2023       Referring Provider/Gynecologist:  Dr. Dorothy Chaves MD  [address]  [place] [address]       Primary Care Provider:  Maria Ines - [place]  [address]  [facility] [address]       RE: Delia Gunn  : 1975  DIVYA: 6/15/2023      Reason for visit: 1) Vaginal lesion. 2) History of vulvar HSIL, surveillance visit. 3) History of anal HSIL, surveillance visit.       History of Present Illness:  Delia Gunn (she/her/hers) is a 48 year old referred to the Gynecologic Oncology HPV Clinic for surveillance of vulvar and vaginal HSIL. History as follows:    *HPV vaccination status: unvaccinated    Vulvar HSIL history:  03: Vulvar wide local excision.   -Pathology: Vulvar HSIL (pre provider notes; pathology report not available for review).     -present: MADAI.      Cervical cancer screening history:  06, 08, 09, 11: Pap test NILM.    14, 16, 18: Pap test NILM, hrHPV-.     23: Pap test NILM, non-16/18 hrHPV+.   23: Exam by gynecologist Dr. Chaves.  -Findings: small area of the vaginal mucosa that appears whitish and thickened 1 cm inside the introitus at 12 o'clock (under urethra).   -Biopsy pathology: Normal epithelium, no dyplasia or malignancy.      Anal HSIL history:  05, 06: Anal cytology NILM    11: Veronica-anal biopsy pathology 4:00: Anal HSIL (AIN3)  8/3/11: HRA, biopsies & fulguration.   -Anal biopsies pathology: Anal LSIL (AIN1).     11, 3/29/13: Anal cytology NILM.       Subjective:  Delia presents to the gyn onc HPV clinic today for evaluation of a non-16/18 hrHPV+ cervical cancer screening test and vulvar HSIL surveillance, unaccompanied.   Delia confirms the documented history above.   Delia reports that she has noted some lumps in the vagina. Delia reports that  her  first noticed the vaginal lumps, described as a rough area, ~1 year ago. They have not changed over this time. No pain. She does have some itching. She actually has more itching around the anus. No abnormal bleeding nor discharge. She continues to get regular monthly periods. She does note they are heavier, but they are still predictable. She has also developed hot flushes and night sweats. She also notes weight gain over the past few years, although she has been able to maintain her weight throughout the pandemic. She has tried multiple weight loss programs (e.g. Noom) without success.       Review of Systems:   ROS: 10 point ROS neg other than the symptoms noted above in the HPI.       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 Olson's       Current Medications:   Current Outpatient Medications   Medication     Ibuprofen (ADVIL PO)     No current facility-administered medications for this visit.        Allergies:   Allergies   Allergen Reactions     Coconut Flavor Swelling        Social History:  Patient lives with her  and teenage kids. 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 She does not have Advanced Directives, but has identified her  Brian Gunn as her desired Healthcare Power of .    Social History     Tobacco Use     Smoking status: Never     Smokeless tobacco: Never   Vaping Use     Vaping status: Not on file   Substance Use Topics     Alcohol use: Yes     Alcohol/week: 0.0 standard  "drinks of alcohol     Comment: wine on weekends       History   Drug Use No       Family History:   The patient's family history is notable for a second-degree maternal relative with breast cancer, and a second-degree maternal relative with melanoma, and a second-degree paternal relative with colon cancer. No known family history of ovarian, uterine, urothelial/renal, prostate or pancreatic cancers.   Family History   Problem Relation Age of Onset     Hypertension Father      Lipids Father      Lipids Sister      Heart Disease Brother         ablation     Lipids Brother      Throat cancer Maternal Grandmother 40        esophageal. Reported abdominal     C.A.D. Paternal Grandfather         bypass surgery     Cerebrovascular Disease Paternal Grandfather      Breast Cancer Maternal Aunt 44     Melanoma Maternal Uncle 39        melonoma     Lung Cancer Maternal Uncle         lung cancer       Physical Exam:   /76 (BP Location: Right arm, Patient Position: Sitting, Cuff Size: Adult Large)   Pulse 74   Temp 97.7  F (36.5  C) (Oral)   Resp 20   Ht 1.664 m (5' 5.5\")   Wt 104 kg (229 lb 3.2 oz)   SpO2 97%   BMI 37.56 kg/m    Body mass index is 37.56 kg/m .    General Appearance: healthy and alert, no distress     Musculoskeletal: extremities without edema    Skin: no lesions or rashes     Neurological: normal gait, no gross defects     Psychiatric: appropriate mood and affect                               Hematological: Normal inguinal lymph nodes     Genitourinary: External genitalia and urethral meatus appears normal. Vaginal exam with an area of thickening at the distal anterior vagina, almost condlyomatous in appearance. No pigmentation changes.  Digital anorectal exam without nodularity.       Procedure Risk Statement:   The patient was counseled regarding risks, benefits and alternatives to vaginoscopy, vulvoscopy, possible biopsies.  Risks discussed include but not limited to:  Bleeding; infection; injury " to adjacent organs; inadequate sample or false negative result.  The patient's questions were answered, and informed consent signed.       Procedure:   Vulvoscopy:  After informed consent was signed and time-out procedure performed, dilute acetic acid was applied to the vulva x1 minute.  -Acetowhite changes? No  -Other lesions? No  -Clinical impression: Normal  -Specimens: None indicated    Vaginoscopy:  After the informed consent was signed and time-out procedure was performed, dilute acetic acid was applied to the vagina, and the vagina was visualized under colposcopic enhancement.   -Acetowhite changes? No  -Lugol's non-staining areas? NA  -Mosaicism, punctations, other lesions? No  -Clinical impression: Normal; condylomatous changes at the anterior distal vagina.  -Specimens: None--anterior vagina previously biopsied and negative for dysplasia/cancer    Colposcopy (incidentally performed at the time of vaginoscopy):  dilute acetic acid was applied to the cervix. Colposcopy performed.  -Squamocolumnar junction fully visualized? Yes  -Acetowhite changes? No  -Punctations, mosaicism, abnormal vasculature, other abnormalities? Fine mosaicism at 12:00 at the transformation zone.  -Clinical Impression: LSIL*  -Specimens: Ectocervix, 12:00         Performance Status:  ECOG Grade 0.       Assessment:  Delia Gunn (she/her/hers) is a 48 year old woman with a diagnosis of vulvar HSIL, currently without evidence of disease.    Non-16/18 hrHPV+ cervical cancer screening. Colposcopic impression LSIL, biopsy pathology pending. Vaginal changes most c/w condyloma, previous biopsy negative for dysplasia/malignancy.    History of anal HSIL, currently without evidence of disease.    A total of 45 minutes was spent with the patient, 30 minutes of which were spent in counseling the patient and/or treatment planning, 10 minutes of which were spent in the procedure above; an additional 5 minutes was spent in chart  review/documentation.      Plan:     1.)    Vulvar HSIL:   Surveillance as follows: Annually indefinitely with vulvoscopy at each visit.  She will call in the interim if she notes any new vulvar lesions, has persistent vulvar itching, or other concerning symptoms.     2.) Non-16/18 hrHPV+ cervical cancer screening: I will notify Delia of the cervical biopsy result via [employer].  -If HSIL: RTC to discuss excisional procedure with LEEP.  -If LSIL or negative: Repeat HPV-based testing in 1 year.    3.) Vaginal thickening: Clinical impression vaginal rugation vs condylomatous changes. Previous biopsy by Dr. Chaves negative for dysplasia/malignancy, will continue monitoring at exam next year.  Delia will call in the interim if she notes changes in size/architecture, bleeding, pain or other concerning symptoms.     4.) Anal HSIL: Digital anorectal exam performed today; anal cytology/HPV test deferred due to lack of the correct swab.   RTC in 1 year for anal cytology/HPV co-tram.t      5.) Genetic risk factors were assessed and the patient does not meet the qualifications for a referral.      6.) Labs and/or tests ordered include:  Surgical pathology: Cervical biopsy 12:00     6.) Health maintenance issues addressed today include None.    7.) Code status:  Full-code.    8.) Prescriptions: None.      Faina Whatley MD, MS, FACOG, FACS  6/15/2023  12:52 PM        CC  Patient Care Team:  Clinic - [facility] as PCP - Tg Andrade MD as Assigned PCP  Faina Whatley MD as MD (Gynecologic Oncology)  Dorothy Chaves MD as Assigned OBGYN Provider  DOROTHY CHAVES

## 2023-06-13 NOTE — PATIENT INSTRUCTIONS
SCHEDULING:  -RTC in 1 year for vulvar HSIL surveillance, repeat HPV-based testing, and anal cytology (MD, in-person, HPV clinic) --order(s) placed       DIAGNOSIS:  Vulvar HSIL (VIN3, pre-cancerous changes), currently without evidence of disease.  Treatment History:  -1/1/03: Vulvar wide local excision.    Non-16/18 hrHPV+ cervical cancer screening.     Anal HSIL (AIN3, pre-cancerous changes).   Treatment History:  -8/3/11: High-resolution anoscopy, biopsies, fulguration.      PLAN:  1) Vulvar HSIL:   Surveillance as follows: Anually indefinitely with vulvoscopy at each visit.   Please call in the interim if you note any new vulvar lesions, has persistent vulvar itching, or other concerning symptoms. [phone]     2) non-16/18 hrHPV+ cervical cancer screening/vaginal changes: I will notify you of the cervical biopsy pathology via [employer].  -If HSIL: RTC to discuss excisional procedure with LEEP.  -If LSIL or negative: Repeat HPV-based testing in 1 year.    3) Anal HSIL: Surveillance in 1 year with anal cytology/HPV co-test.      4) Vaginal changes: Clinical impression vaginal rugations vs condylomatous changes. BIopsy pathology from Dr. Chaves negative for dysplasia/cancer, will continue monitoring.       Please call with any questions or concerns. [phone]   It was a pleasure meeting you.      Faina Whatley MD, MS, FACOG, FACS  6/15/2023  12:45 PM

## 2023-06-14 ENCOUNTER — TRANSFERRED RECORDS (OUTPATIENT)
Dept: HEALTH INFORMATION MANAGEMENT | Facility: CLINIC | Age: 48
End: 2023-06-14
Payer: COMMERCIAL

## 2023-06-14 ENCOUNTER — LAB REQUISITION (OUTPATIENT)
Dept: LAB | Facility: CLINIC | Age: 48
End: 2023-06-14
Payer: COMMERCIAL

## 2023-06-14 PROCEDURE — 88305 TISSUE EXAM BY PATHOLOGIST: CPT | Mod: TC,ORL | Performed by: COLON & RECTAL SURGERY

## 2023-06-15 ENCOUNTER — OFFICE VISIT (OUTPATIENT)
Dept: ONCOLOGY | Facility: CLINIC | Age: 48
End: 2023-06-15
Attending: OBSTETRICS & GYNECOLOGY
Payer: COMMERCIAL

## 2023-06-15 VITALS
BODY MASS INDEX: 36.83 KG/M2 | WEIGHT: 229.2 LBS | HEART RATE: 74 BPM | OXYGEN SATURATION: 97 % | HEIGHT: 66 IN | TEMPERATURE: 97.7 F | RESPIRATION RATE: 20 BRPM | SYSTOLIC BLOOD PRESSURE: 117 MMHG | DIASTOLIC BLOOD PRESSURE: 76 MMHG

## 2023-06-15 DIAGNOSIS — N89.3 VAIN (VAGINAL INTRAEPITHELIAL NEOPLASIA): ICD-10-CM

## 2023-06-15 DIAGNOSIS — D07.1 VIN III (VULVAR INTRAEPITHELIAL NEOPLASIA III): ICD-10-CM

## 2023-06-15 DIAGNOSIS — D01.3 AIN GRADE III: ICD-10-CM

## 2023-06-15 DIAGNOSIS — A63.0 HPV (HUMAN PAPILLOMA VIRUS) ANOGENITAL INFECTION: Primary | ICD-10-CM

## 2023-06-15 PROCEDURE — G0463 HOSPITAL OUTPT CLINIC VISIT: HCPCS | Mod: 25 | Performed by: OBSTETRICS & GYNECOLOGY

## 2023-06-15 PROCEDURE — 88305 TISSUE EXAM BY PATHOLOGIST: CPT | Mod: 26 | Performed by: PATHOLOGY

## 2023-06-15 PROCEDURE — 57421 EXAM/BIOPSY OF VAG W/SCOPE: CPT | Performed by: OBSTETRICS & GYNECOLOGY

## 2023-06-15 PROCEDURE — 56820 COLPOSCOPY VULVA: CPT | Performed by: OBSTETRICS & GYNECOLOGY

## 2023-06-15 PROCEDURE — 99204 OFFICE O/P NEW MOD 45 MIN: CPT | Mod: 25 | Performed by: OBSTETRICS & GYNECOLOGY

## 2023-06-15 PROCEDURE — 88305 TISSUE EXAM BY PATHOLOGIST: CPT | Mod: TC | Performed by: OBSTETRICS & GYNECOLOGY

## 2023-06-15 ASSESSMENT — PAIN SCALES - GENERAL: PAINLEVEL: NO PAIN (0)

## 2023-06-15 NOTE — LETTER
6/15/2023         RE: Delia Gunn  [address]  [facility] [address]        Dear Colleague,    Thank you for referring your patient, Delia Gunn, to the [facility]. Please see a copy of my visit note below.    Consult Note on Referred Patient  Gynecologic Oncology HPV Clinic  [facility]      Date: 2023       Referring Provider/Gynecologist:  Dr. Dorothy Chaves MD  [address]  [place] [address]       Primary Care Provider:  Maria Ines - [place], [facility]  [address]  [facility] [address]       RE: Delia Gunn  : 1975  DIVYA: 6/15/2023      Reason for visit: 1) Vaginal lesion. 2) History of vulvar HSIL, surveillance visit. 3) History of anal HSIL, surveillance visit.       History of Present Illness:  Delia Gunn (she/her/hers) is a 48 year old referred to the Gynecologic Oncology HPV Clinic for surveillance of vulvar and vaginal HSIL. History as follows:    *HPV vaccination status: unvaccinated    Vulvar HSIL history:  03: Vulvar wide local excision.   -Pathology: Vulvar HSIL (pre provider notes; pathology report not available for review).     -present: MADAI.      Cervical cancer screening history:  06, 08, 09, 11: Pap test NILM.    14, 16, 18: Pap test NILM, hrHPV-.     23: Pap test NILM, non-16/18 hrHPV+.   23: Exam by gynecologist Dr. Chaves.  -Findings: small area of the vaginal mucosa that appears whitish and thickened 1 cm inside the introitus at 12 o'clock (under urethra).   -Biopsy pathology: Normal epithelium, no dyplasia or malignancy.      Anal HSIL history:  05, 06: Anal cytology NILM    11: Veronica-anal biopsy pathology 4:00: Anal HSIL (AIN3)  8/3/11: HRA, biopsies & fulguration.   -Anal biopsies pathology: Anal LSIL (AIN1).     11, 3/29/13: Anal cytology NILM.       Subjective:  Delia presents to  the gyn onc HPV clinic today for evaluation of a non-16/18 hrHPV+ cervical cancer screening test and vulvar HSIL surveillance, unaccompanied.   Delia confirms the documented history above.   Delia reports that she has noted some lumps in the vagina. Delia reports that her  first noticed the vaginal lumps, described as a rough area, ~1 year ago. They have not changed over this time. No pain. She does have some itching. She actually has more itching around the anus. No abnormal bleeding nor discharge. She continues to get regular monthly periods. She does note they are heavier, but they are still predictable. She has also developed hot flushes and night sweats. She also notes weight gain over the past few years, although she has been able to maintain her weight throughout the pandemic. She has tried multiple weight loss programs (e.g. Noom) without success.       Review of Systems:   ROS: 10 point ROS neg other than the symptoms noted above in the HPI.       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 Olson's       Current Medications:   Current Outpatient Medications   Medication     Ibuprofen (ADVIL PO)     No current facility-administered medications for this visit.        Allergies:   Allergies   Allergen Reactions     Coconut Flavor Swelling        Social History:  Patient lives with her  and teenage kids. 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 She does not have Advanced Directives, but has identified her  " Brian Gunn as her desired Healthcare Power of .    Social History     Tobacco Use     Smoking status: Never     Smokeless tobacco: Never   Vaping Use     Vaping status: Not on file   Substance Use Topics     Alcohol use: Yes     Alcohol/week: 0.0 standard drinks of alcohol     Comment: wine on weekends       History   Drug Use No       Family History:   The patient's family history is notable for a second-degree maternal relative with breast cancer, and a second-degree maternal relative with melanoma, and a second-degree paternal relative with colon cancer. No known family history of ovarian, uterine, urothelial/renal, prostate or pancreatic cancers.   Family History   Problem Relation Age of Onset     Hypertension Father      Lipids Father      Lipids Sister      Heart Disease Brother         ablation     Lipids Brother      Throat cancer Maternal Grandmother 40        esophageal. Reported abdominal     C.A.D. Paternal Grandfather         bypass surgery     Cerebrovascular Disease Paternal Grandfather      Breast Cancer Maternal Aunt 44     Melanoma Maternal Uncle 39        melonoma     Lung Cancer Maternal Uncle         lung cancer       Physical Exam:   /76 (BP Location: Right arm, Patient Position: Sitting, Cuff Size: Adult Large)   Pulse 74   Temp 97.7  F (36.5  C) (Oral)   Resp 20   Ht 1.664 m (5' 5.5\")   Wt 104 kg (229 lb 3.2 oz)   SpO2 97%   BMI 37.56 kg/m    Body mass index is 37.56 kg/m .    General Appearance: healthy and alert, no distress     Musculoskeletal: extremities without edema    Skin: no lesions or rashes     Neurological: normal gait, no gross defects     Psychiatric: appropriate mood and affect                               Hematological: Normal inguinal lymph nodes     Genitourinary: External genitalia and urethral meatus appears normal. Vaginal exam with an area of thickening at the distal anterior vagina, almost condlyomatous in appearance. No pigmentation " changes.  Digital anorectal exam without nodularity.       Procedure Risk Statement:   The patient was counseled regarding risks, benefits and alternatives to vaginoscopy, vulvoscopy, possible biopsies.  Risks discussed include but not limited to:  Bleeding; infection; injury to adjacent organs; inadequate sample or false negative result.  The patient's questions were answered, and informed consent signed.       Procedure:   Vulvoscopy:  After informed consent was signed and time-out procedure performed, dilute acetic acid was applied to the vulva x1 minute.  -Acetowhite changes? No  -Other lesions? No  -Clinical impression: Normal  -Specimens: None indicated    Vaginoscopy:  After the informed consent was signed and time-out procedure was performed, dilute acetic acid was applied to the vagina, and the vagina was visualized under colposcopic enhancement.   -Acetowhite changes? No  -Lugol's non-staining areas? NA  -Mosaicism, punctations, other lesions? No  -Clinical impression: Normal; condylomatous changes at the anterior distal vagina.  -Specimens: None--anterior vagina previously biopsied and negative for dysplasia/cancer    Colposcopy (incidentally performed at the time of vaginoscopy):  dilute acetic acid was applied to the cervix. Colposcopy performed.  -Squamocolumnar junction fully visualized? Yes  -Acetowhite changes? No  -Punctations, mosaicism, abnormal vasculature, other abnormalities? Fine mosaicism at 12:00 at the transformation zone.  -Clinical Impression: LSIL*  -Specimens: Ectocervix, 12:00         Performance Status:  ECOG Grade 0.       Assessment:  Delia Gunn (she/her/hers) is a 48 year old woman with a diagnosis of vulvar HSIL, currently without evidence of disease.    Non-16/18 hrHPV+ cervical cancer screening. Colposcopic impression LSIL, biopsy pathology pending. Vaginal changes most c/w condyloma, previous biopsy negative for dysplasia/malignancy.    History of anal HSIL, currently  without evidence of disease.    A total of 45 minutes was spent with the patient, 30 minutes of which were spent in counseling the patient and/or treatment planning, 10 minutes of which were spent in the procedure above; an additional 5 minutes was spent in chart review/documentation.      Plan:     1.)    Vulvar HSIL:   Surveillance as follows: Annually indefinitely with vulvoscopy at each visit.  She will call in the interim if she notes any new vulvar lesions, has persistent vulvar itching, or other concerning symptoms.     2.) Non-16/18 hrHPV+ cervical cancer screening: I will notify Delia of the cervical biopsy result via [employer].  -If HSIL: RTC to discuss excisional procedure with LEEP.  -If LSIL or negative: Repeat HPV-based testing in 1 year.    3.) Vaginal thickening: Clinical impression vaginal rugation vs condylomatous changes. Previous biopsy by Dr. Chaves negative for dysplasia/malignancy, will continue monitoring at exam next year.  Delia will call in the interim if she notes changes in size/architecture, bleeding, pain or other concerning symptoms.     4.) Anal HSIL: Digital anorectal exam performed today; anal cytology/HPV test deferred due to lack of the correct swab.   RTC in 1 year for anal cytology/HPV co-tram.t      5.) Genetic risk factors were assessed and the patient does not meet the qualifications for a referral.      6.) Labs and/or tests ordered include:  Surgical pathology: Cervical biopsy 12:00     6.) Health maintenance issues addressed today include None.    7.) Code status:  Full-code.    8.) Prescriptions: None.      Faina Whatley MD, MS, FACOG, FACS  6/15/2023  12:52 PM        CC  Patient Care Team:  Clinic - [facility] as PCP - Tg Andrade MD as Assigned PCP  Faina Whatley MD as MD (Gynecologic Oncology)  Dorothy Chaves MD as Assigned OBGYN Provider  DOROTHY CHAVES      Oncology Rooming Note    Mary 15, 2023 11:57 AM   Delia  "James Gunn is a 48 year old female who presents for:    Chief Complaint   Patient presents with     Oncology Clinic Visit     Initial Vitals: /76 (BP Location: Right arm, Patient Position: Sitting, Cuff Size: Adult Large)   Pulse 74   Temp 97.7  F (36.5  C) (Oral)   Resp 20   Ht 1.664 m (5' 5.5\")   Wt 104 kg (229 lb 3.2 oz)   SpO2 97%   BMI 37.56 kg/m   Estimated body mass index is 37.56 kg/m  as calculated from the following:    Height as of this encounter: 1.664 m (5' 5.5\").    Weight as of this encounter: 104 kg (229 lb 3.2 oz). Body surface area is 2.19 meters squared.  No Pain (0) Comment: Data Unavailable   No LMP recorded.  Allergies reviewed: Yes  Medications reviewed: Yes    Medications: Medication refills not needed today.  Pharmacy name entered into [employer]: [id number] [place] [id number]    Clinical concerns: no     Cayla Leblanc [facility]                  Again, thank you for allowing me to participate in the care of your patient.        Sincerely,        Faina Whatley MD    "

## 2023-06-15 NOTE — PROGRESS NOTES
"Oncology Rooming Note    Mary 15, 2023 11:57 AM   Delia Gunn is a 48 year old female who presents for:    Chief Complaint   Patient presents with     Oncology Clinic Visit     Initial Vitals: /76 (BP Location: Right arm, Patient Position: Sitting, Cuff Size: Adult Large)   Pulse 74   Temp 97.7  F (36.5  C) (Oral)   Resp 20   Ht 1.664 m (5' 5.5\")   Wt 104 kg (229 lb 3.2 oz)   SpO2 97%   BMI 37.56 kg/m   Estimated body mass index is 37.56 kg/m  as calculated from the following:    Height as of this encounter: 1.664 m (5' 5.5\").    Weight as of this encounter: 104 kg (229 lb 3.2 oz). Body surface area is 2.19 meters squared.  No Pain (0) Comment: Data Unavailable   No LMP recorded.  Allergies reviewed: Yes  Medications reviewed: Yes    Medications: Medication refills not needed today.  Pharmacy name entered into [employer]: [id number] [place] [id number]    Clinical concerns: no     Cayla Leblanc CMA              "

## 2023-06-15 NOTE — Clinical Note
Hi Dr. Chaves Thank you for referring Delia Gunn to the gyn onc HPV clinic for evaluation of an HPV+ cervical cancer screening, evaluation of a vaginal lesion, and surveillance for vulvar and anal HSIL. Exam showed no evidence of recurrent disease today. The vaginal thickening was overall not concerning for malignancy, and since you had already biopsied it with negative pathology, I did not re-biopsy today. Delia will follow-up in the HPV clinic in 1 year for continued surveillance. Please contact me if you have any questions or concerns. Thank you for referring such a dionisio patient.  --scotty Whatley MD, MS, FACOG, FACS 6/15/2023 5:42 PM

## 2023-06-19 PROCEDURE — 88305 TISSUE EXAM BY PATHOLOGIST: CPT | Mod: 26 | Performed by: PATHOLOGY

## 2024-01-03 ENCOUNTER — PATIENT OUTREACH (OUTPATIENT)
Dept: CARE COORDINATION | Facility: CLINIC | Age: 49
End: 2024-01-03
Payer: COMMERCIAL

## 2024-01-23 ENCOUNTER — PATIENT OUTREACH (OUTPATIENT)
Dept: CARE COORDINATION | Facility: CLINIC | Age: 49
End: 2024-01-23
Payer: COMMERCIAL

## 2024-01-31 ENCOUNTER — PATIENT OUTREACH (OUTPATIENT)
Dept: CARE COORDINATION | Facility: CLINIC | Age: 49
End: 2024-01-31
Payer: COMMERCIAL

## 2024-02-06 ENCOUNTER — PATIENT OUTREACH (OUTPATIENT)
Dept: CARE COORDINATION | Facility: CLINIC | Age: 49
End: 2024-02-06
Payer: COMMERCIAL

## 2024-02-06 ENCOUNTER — PATIENT OUTREACH (OUTPATIENT)
Dept: FAMILY MEDICINE | Facility: CLINIC | Age: 49
End: 2024-02-06
Payer: COMMERCIAL

## 2024-02-06 DIAGNOSIS — R87.810 CERVICAL HIGH RISK HPV (HUMAN PAPILLOMAVIRUS) TEST POSITIVE: Primary | ICD-10-CM

## 2024-02-28 ENCOUNTER — VIRTUAL VISIT (OUTPATIENT)
Dept: FAMILY MEDICINE | Facility: CLINIC | Age: 49
End: 2024-02-28
Payer: COMMERCIAL

## 2024-02-28 DIAGNOSIS — J01.90 ACUTE SINUSITIS WITH SYMPTOMS > 10 DAYS: Primary | ICD-10-CM

## 2024-02-28 PROCEDURE — 99213 OFFICE O/P EST LOW 20 MIN: CPT | Mod: 95 | Performed by: INTERNAL MEDICINE

## 2024-02-28 NOTE — PROGRESS NOTES
"Delia is a 48 year old who is being evaluated via a billable video visit.        Instructions Relayed to Patient by Virtual Roomer:     Patient is active on [employer]:   Relayed following to patient: \"It looks like you are active on [facility], are you able to join the visit this way? If not, do you need us to send you a link now or would you like your provider to send a link via text or email when they are ready to initiate the visit?\"    Reminded patient to ensure they were logged on to virtual visit by arrival time listed. Documented in appointment notes if patient had flexibility to initiate visit sooner than arrival time. If pediatric virtual visit, ensured pediatric patient along with parent/guardian will be present for video visit.     Patient offered the website [email or web address] and/or phone number to [employer] Help line: [phone]   How would you like to obtain your AVS? [employer]  If the video visit is dropped, the invitation should be resent by: Text to cell phone: [phone]  Will anyone else be joining your video visit? No          Assessment & Plan     Acute sinusitis with symptoms > 10 days  Started having some sinusitis symptoms around 4 weeks ago  Initially had some runny nose  Then started having some sinus congestion  Also has cough which is worse in the morning  Bringing up some phlegm at times  Clear to yellow nasal drainage  Some cheekbone pain  No fever  COVID test negative  Also since yesterday having some ear pain  Symptoms are consistent with sinusitis  Since her symptoms are ongoing for more than 10 days I think it is reasonable to start a course of antibiotics  We also discussed about steam inhalation and Kavitha pot  - amoxicillin-clavulanate (AUGMENTIN) 875-125 MG tablet; Take 1 tablet by mouth 2 times daily      25 minutes spent by me on the date of the encounter doing chart review, history and exam, documentation and further activities per the note      BMI  Estimated " "body mass index is 37.56 kg/m  as calculated from the following:    Height as of 6/15/23: 1.664 m (5' 5.5\").    Weight as of 6/15/23: 104 kg (229 lb 3.2 oz).             Alexa Lin is a 48 year old, presenting for the following health issues:  Sinus Problem        2/28/2024    11:17 AM   Additional Questions   Roomed by Richardson CRAMER     History of Present Illness       Reason for visit:  Sinus Problem    She eats 4 or more servings of fruits and vegetables daily.She consumes 0 sweetened beverage(s) daily.She exercises with enough effort to increase her heart rate 30 to 60 minutes per day.  She exercises with enough effort to increase her heart rate 3 or less days per week.   She is taking medications regularly.       Acute Illness  Acute illness concerns: Sinus Problem  Onset/Duration: 4 weeks   Symptoms:  Fever: No  Chills/Sweats: No  Headache (location?): YES  Sinus Pressure: YES  Conjunctivitis:  No  Ear Pain: YES: both  Rhinorrhea: YES  Congestion: YES  Sore Throat: No  Cough: YES  Wheeze: \"it was there but now its gone\"   Decreased Appetite: No  Nausea: No  Vomiting: No  Diarrhea: No  Dysuria/Freq.: No  Dysuria or Hematuria: No  Fatigue/Achiness: No  Sick/Strep Exposure: No  Therapies tried and outcome: Mucinex, Nasal Rinse        Review of Systems  Constitutional, HEENT, cardiovascular, pulmonary, gi and gu systems are negative, except as otherwise noted.      Objective           Vitals:  No vitals were obtained today due to virtual visit.    Physical Exam   GENERAL: alert and no distress  EYES: Eyes grossly normal to inspection.  No discharge or erythema, or obvious scleral/conjunctival abnormalities.  RESP: No audible wheeze, cough, or visible cyanosis.    SKIN: Visible skin clear. No significant rash, abnormal pigmentation or lesions.  NEURO: Cranial nerves grossly intact.  Mentation and speech appropriate for age.  PSYCH: Appropriate affect, tone, and pace of words          Video-Visit Details    Type " of service:  Video Visit     Originating Location (pt. Location): Home    Distant Location (provider location):  Off-site  Platform used for Video Visit: Honorio  Signed Electronically by: Surjit Salazar MD

## 2024-04-13 ENCOUNTER — HEALTH MAINTENANCE LETTER (OUTPATIENT)
Age: 49
End: 2024-04-13

## 2024-05-31 ENCOUNTER — NURSE TRIAGE (OUTPATIENT)
Dept: FAMILY MEDICINE | Facility: CLINIC | Age: 49
End: 2024-05-31
Payer: COMMERCIAL

## 2024-05-31 ENCOUNTER — APPOINTMENT (OUTPATIENT)
Dept: ULTRASOUND IMAGING | Facility: CLINIC | Age: 49
End: 2024-05-31
Attending: EMERGENCY MEDICINE
Payer: COMMERCIAL

## 2024-05-31 ENCOUNTER — HOSPITAL ENCOUNTER (OUTPATIENT)
Facility: CLINIC | Age: 49
Setting detail: OBSERVATION
Discharge: HOME | End: 2024-06-01
Attending: EMERGENCY MEDICINE | Admitting: INTERNAL MEDICINE
Payer: COMMERCIAL

## 2024-05-31 DIAGNOSIS — K80.20 GALLSTONES: ICD-10-CM

## 2024-05-31 DIAGNOSIS — R79.89 ELEVATED LFTS: ICD-10-CM

## 2024-05-31 DIAGNOSIS — K81.0 ACUTE CHOLECYSTITIS: Primary | ICD-10-CM

## 2024-05-31 LAB
ALBUMIN SERPL BCG-MCNC: 4.5 G/DL (ref 3.5–5.2)
ALBUMIN UR-MCNC: NEGATIVE MG/DL
ALP SERPL-CCNC: 193 U/L (ref 40–150)
ALT SERPL W P-5'-P-CCNC: 1228 U/L (ref 0–50)
ANION GAP SERPL CALCULATED.3IONS-SCNC: 14 MMOL/L (ref 7–15)
APPEARANCE UR: CLEAR
AST SERPL W P-5'-P-CCNC: 758 U/L (ref 0–45)
BACTERIA #/AREA URNS HPF: ABNORMAL /HPF
BASOPHILS # BLD AUTO: 0.1 10E3/UL (ref 0–0.2)
BASOPHILS NFR BLD AUTO: 1 %
BILIRUB SERPL-MCNC: 1.6 MG/DL
BILIRUB UR QL STRIP: NEGATIVE
BUN SERPL-MCNC: 6.3 MG/DL (ref 6–20)
CALCIUM SERPL-MCNC: 9.2 MG/DL (ref 8.6–10)
CHLORIDE SERPL-SCNC: 102 MMOL/L (ref 98–107)
COLOR UR AUTO: ABNORMAL
CREAT SERPL-MCNC: 0.76 MG/DL (ref 0.51–0.95)
DEPRECATED HCO3 PLAS-SCNC: 21 MMOL/L (ref 22–29)
EGFRCR SERPLBLD CKD-EPI 2021: >90 ML/MIN/1.73M2
EOSINOPHIL # BLD AUTO: 0.2 10E3/UL (ref 0–0.7)
EOSINOPHIL NFR BLD AUTO: 2 %
ERYTHROCYTE [DISTWIDTH] IN BLOOD BY AUTOMATED COUNT: 12.8 % (ref 10–15)
GLUCOSE SERPL-MCNC: 100 MG/DL (ref 70–99)
GLUCOSE UR STRIP-MCNC: NEGATIVE MG/DL
HCT VFR BLD AUTO: 42.7 % (ref 35–47)
HGB BLD-MCNC: 14.5 G/DL (ref 11.7–15.7)
HGB UR QL STRIP: ABNORMAL
HOLD SPECIMEN: NORMAL
HOLD SPECIMEN: NORMAL
IMM GRANULOCYTES # BLD: 0 10E3/UL
IMM GRANULOCYTES NFR BLD: 0 %
KETONES UR STRIP-MCNC: NEGATIVE MG/DL
LEUKOCYTE ESTERASE UR QL STRIP: NEGATIVE
LIPASE SERPL-CCNC: 32 U/L (ref 13–60)
LYMPHOCYTES # BLD AUTO: 2.2 10E3/UL (ref 0.8–5.3)
LYMPHOCYTES NFR BLD AUTO: 31 %
MCH RBC QN AUTO: 30.1 PG (ref 26.5–33)
MCHC RBC AUTO-ENTMCNC: 34 G/DL (ref 31.5–36.5)
MCV RBC AUTO: 89 FL (ref 78–100)
MONOCYTES # BLD AUTO: 0.3 10E3/UL (ref 0–1.3)
MONOCYTES NFR BLD AUTO: 4 %
NEUTROPHILS # BLD AUTO: 4.3 10E3/UL (ref 1.6–8.3)
NEUTROPHILS NFR BLD AUTO: 62 %
NITRATE UR QL: NEGATIVE
NRBC # BLD AUTO: 0 10E3/UL
NRBC BLD AUTO-RTO: 0 /100
PH UR STRIP: 5.5 [PH] (ref 5–7)
PLATELET # BLD AUTO: 316 10E3/UL (ref 150–450)
POTASSIUM SERPL-SCNC: 4 MMOL/L (ref 3.4–5.3)
PROT SERPL-MCNC: 7.4 G/DL (ref 6.4–8.3)
RBC # BLD AUTO: 4.81 10E6/UL (ref 3.8–5.2)
RBC URINE: 0 /HPF
SODIUM SERPL-SCNC: 137 MMOL/L (ref 135–145)
SP GR UR STRIP: 1 (ref 1–1.03)
SQUAMOUS EPITHELIAL: <1 /HPF
UROBILINOGEN UR STRIP-MCNC: NORMAL MG/DL
WBC # BLD AUTO: 7 10E3/UL (ref 4–11)
WBC URINE: <1 /HPF

## 2024-05-31 PROCEDURE — 99222 1ST HOSP IP/OBS MODERATE 55: CPT | Performed by: INTERNAL MEDICINE

## 2024-05-31 PROCEDURE — 76705 ECHO EXAM OF ABDOMEN: CPT

## 2024-05-31 PROCEDURE — 99285 EMERGENCY DEPT VISIT HI MDM: CPT | Mod: 25

## 2024-05-31 PROCEDURE — 81001 URINALYSIS AUTO W/SCOPE: CPT | Performed by: EMERGENCY MEDICINE

## 2024-05-31 PROCEDURE — 36415 COLL VENOUS BLD VENIPUNCTURE: CPT | Performed by: EMERGENCY MEDICINE

## 2024-05-31 PROCEDURE — 85025 COMPLETE CBC W/AUTO DIFF WBC: CPT | Performed by: EMERGENCY MEDICINE

## 2024-05-31 PROCEDURE — 83690 ASSAY OF LIPASE: CPT | Performed by: EMERGENCY MEDICINE

## 2024-05-31 PROCEDURE — 80053 COMPREHEN METABOLIC PANEL: CPT | Performed by: EMERGENCY MEDICINE

## 2024-05-31 PROCEDURE — 120N000001 HC R&B MED SURG/OB

## 2024-05-31 ASSESSMENT — COLUMBIA-SUICIDE SEVERITY RATING SCALE - C-SSRS
1. IN THE PAST MONTH, HAVE YOU WISHED YOU WERE DEAD OR WISHED YOU COULD GO TO SLEEP AND NOT WAKE UP?: NO
2. HAVE YOU ACTUALLY HAD ANY THOUGHTS OF KILLING YOURSELF IN THE PAST MONTH?: NO
6. HAVE YOU EVER DONE ANYTHING, STARTED TO DO ANYTHING, OR PREPARED TO DO ANYTHING TO END YOUR LIFE?: NO

## 2024-05-31 ASSESSMENT — ACTIVITIES OF DAILY LIVING (ADL): ADLS_ACUITY_SCORE: 35

## 2024-05-31 NOTE — TELEPHONE ENCOUNTER
Nurse Triage SBAR    Is this a 2nd Level Triage? NO    Situation: Patient calling for abdominal pain and possible gall stones.    Background: Patient began noticing intermittent abdominal pain on 5/25 while riding in the car.  Patient also started noticing urine has been darker.  Patient decided to not eat the following days and began to feel better.  Patient then tried to eat a grilled cheese but had abdominal pain again.  Patient states they are bloated as well, but currently states they feel fine.    Assessment:  RUQ Abdominal pain, intermittent up to 8/10  Darker colored urine  Abdominal bloating      Protocol Recommended Disposition:   Go to ED Now, See More Appropriate Protocol    Recommendation: Go to ED now.  Patient wanted to see if primary care or urgent care may be able to address symptoms.  Patient was advised that symptoms would require the hospital to properly evaluate and treat and was advised to go to the ED.  Patient verbalized understanding.    Jason CRAMER RN      Reason for Disposition   Abdomen bloating or swelling are main symptoms   MODERATE - SEVERE SWELLING of abdomen (e.g., looks very distended or swollen) that is NEW-ONSET or much worse    Additional Information   Negative: Followed an abdomen (stomach) injury   Negative: Chest pain   Negative: Abdominal pain and pregnant < 20 weeks   Negative: Abdominal pain and pregnant 20 or more weeks   Negative: SEVERE difficulty breathing (e.g., struggling for each breath, speaks in single words)   Negative: Shock suspected (e.g., cold/pale/clammy skin, too weak to stand, low BP, rapid pulse)   Negative: Difficult to awaken or acting confused (e.g., disoriented, slurred speech)   Negative: Passed out (i.e., lost consciousness, collapsed and was not responding)   Negative: Visible sweat on face or sweat is dripping down   Negative: Sounds like a life-threatening emergency to the triager   Negative: Sounds like a life-threatening emergency to the triager    Negative: Chest pain   Negative: Menstrual cramps with bloating are main symptoms   Negative: Abdomen pain is main symptom and female   Negative: Abdomen pain is main symptom and male   Negative: Breathing difficulty is main symptom   Negative: Constipation is main symptom   Negative: Diarrhea is main symptom   Negative: Vomiting is main symptom    Protocols used: Abdominal Pain - Upper-A-OH, Abdomen Bloating and Swelling-A-OH

## 2024-06-01 ENCOUNTER — ANESTHESIA EVENT (OUTPATIENT)
Dept: SURGERY | Facility: CLINIC | Age: 49
End: 2024-06-01
Payer: COMMERCIAL

## 2024-06-01 ENCOUNTER — ANESTHESIA (OUTPATIENT)
Dept: SURGERY | Facility: CLINIC | Age: 49
End: 2024-06-01
Payer: COMMERCIAL

## 2024-06-01 ENCOUNTER — APPOINTMENT (OUTPATIENT)
Dept: GENERAL RADIOLOGY | Facility: CLINIC | Age: 49
End: 2024-06-01
Attending: SURGERY
Payer: COMMERCIAL

## 2024-06-01 VITALS
WEIGHT: 195.99 LBS | HEIGHT: 66 IN | TEMPERATURE: 97.4 F | RESPIRATION RATE: 16 BRPM | DIASTOLIC BLOOD PRESSURE: 83 MMHG | SYSTOLIC BLOOD PRESSURE: 117 MMHG | BODY MASS INDEX: 31.5 KG/M2 | OXYGEN SATURATION: 100 % | HEART RATE: 90 BPM

## 2024-06-01 PROBLEM — K80.20 GALLSTONES: Status: ACTIVE | Noted: 2024-06-01

## 2024-06-01 LAB
ALBUMIN SERPL BCG-MCNC: 3.8 G/DL (ref 3.5–5.2)
ALP SERPL-CCNC: 155 U/L (ref 40–150)
ALT SERPL W P-5'-P-CCNC: 912 U/L (ref 0–50)
ANION GAP SERPL CALCULATED.3IONS-SCNC: 12 MMOL/L (ref 7–15)
AST SERPL W P-5'-P-CCNC: 491 U/L (ref 0–45)
BILIRUB SERPL-MCNC: 0.9 MG/DL
BUN SERPL-MCNC: 5.6 MG/DL (ref 6–20)
CALCIUM SERPL-MCNC: 8.8 MG/DL (ref 8.6–10)
CHLORIDE SERPL-SCNC: 106 MMOL/L (ref 98–107)
CREAT SERPL-MCNC: 0.72 MG/DL (ref 0.51–0.95)
DEPRECATED HCO3 PLAS-SCNC: 22 MMOL/L (ref 22–29)
EGFRCR SERPLBLD CKD-EPI 2021: >90 ML/MIN/1.73M2
ERYTHROCYTE [DISTWIDTH] IN BLOOD BY AUTOMATED COUNT: 13 % (ref 10–15)
GLUCOSE SERPL-MCNC: 94 MG/DL (ref 70–99)
HCT VFR BLD AUTO: 38.1 % (ref 35–47)
HGB BLD-MCNC: 12.9 G/DL (ref 11.7–15.7)
MCH RBC QN AUTO: 30.2 PG (ref 26.5–33)
MCHC RBC AUTO-ENTMCNC: 33.9 G/DL (ref 31.5–36.5)
MCV RBC AUTO: 89 FL (ref 78–100)
PLATELET # BLD AUTO: 261 10E3/UL (ref 150–450)
POTASSIUM SERPL-SCNC: 3.9 MMOL/L (ref 3.4–5.3)
PROT SERPL-MCNC: 6.2 G/DL (ref 6.4–8.3)
RBC # BLD AUTO: 4.27 10E6/UL (ref 3.8–5.2)
SODIUM SERPL-SCNC: 140 MMOL/L (ref 135–145)
WBC # BLD AUTO: 7.1 10E3/UL (ref 4–11)

## 2024-06-01 PROCEDURE — 250N000009 HC RX 250: Performed by: SURGERY

## 2024-06-01 PROCEDURE — 250N000011 HC RX IP 250 OP 636: Performed by: SURGERY

## 2024-06-01 PROCEDURE — 250N000013 HC RX MED GY IP 250 OP 250 PS 637: Performed by: INTERNAL MEDICINE

## 2024-06-01 PROCEDURE — 99238 HOSP IP/OBS DSCHRG MGMT 30/<: CPT | Performed by: PHYSICIAN ASSISTANT

## 2024-06-01 PROCEDURE — 250N000009 HC RX 250: Performed by: NURSE ANESTHETIST, CERTIFIED REGISTERED

## 2024-06-01 PROCEDURE — 710N000009 HC RECOVERY PHASE 1, LEVEL 1, PER MIN: Performed by: SURGERY

## 2024-06-01 PROCEDURE — 250N000025 HC SEVOFLURANE, PER MIN: Performed by: SURGERY

## 2024-06-01 PROCEDURE — 250N000009 HC RX 250: Performed by: ANESTHESIOLOGY

## 2024-06-01 PROCEDURE — 272N000001 HC OR GENERAL SUPPLY STERILE: Performed by: SURGERY

## 2024-06-01 PROCEDURE — 999N000179 XR SURGERY CARM FLUORO LESS THAN 5 MIN W STILLS

## 2024-06-01 PROCEDURE — 255N000002 HC RX 255 OP 636: Performed by: SURGERY

## 2024-06-01 PROCEDURE — 360N000083 HC SURGERY LEVEL 3 W/ FLUORO, PER MIN: Performed by: SURGERY

## 2024-06-01 PROCEDURE — 250N000011 HC RX IP 250 OP 636: Mod: JZ | Performed by: ANESTHESIOLOGY

## 2024-06-01 PROCEDURE — 96360 HYDRATION IV INFUSION INIT: CPT

## 2024-06-01 PROCEDURE — 88304 TISSUE EXAM BY PATHOLOGIST: CPT | Mod: TC | Performed by: SURGERY

## 2024-06-01 PROCEDURE — 36415 COLL VENOUS BLD VENIPUNCTURE: CPT | Performed by: INTERNAL MEDICINE

## 2024-06-01 PROCEDURE — 258N000003 HC RX IP 258 OP 636: Performed by: INTERNAL MEDICINE

## 2024-06-01 PROCEDURE — 250N000011 HC RX IP 250 OP 636: Performed by: NURSE ANESTHETIST, CERTIFIED REGISTERED

## 2024-06-01 PROCEDURE — 370N000017 HC ANESTHESIA TECHNICAL FEE, PER MIN: Performed by: SURGERY

## 2024-06-01 PROCEDURE — 80053 COMPREHEN METABOLIC PANEL: CPT | Performed by: INTERNAL MEDICINE

## 2024-06-01 PROCEDURE — 710N000012 HC RECOVERY PHASE 2, PER MINUTE: Performed by: SURGERY

## 2024-06-01 PROCEDURE — 85014 HEMATOCRIT: CPT | Performed by: INTERNAL MEDICINE

## 2024-06-01 PROCEDURE — 47563 LAPARO CHOLECYSTECTOMY/GRAPH: CPT | Performed by: SURGERY

## 2024-06-01 PROCEDURE — 999N000141 HC STATISTIC PRE-PROCEDURE NURSING ASSESSMENT: Performed by: SURGERY

## 2024-06-01 PROCEDURE — 99204 OFFICE O/P NEW MOD 45 MIN: CPT | Mod: 57 | Performed by: SURGERY

## 2024-06-01 PROCEDURE — 258N000001 HC RX 258: Performed by: SURGERY

## 2024-06-01 PROCEDURE — 96361 HYDRATE IV INFUSION ADD-ON: CPT | Mod: 59

## 2024-06-01 PROCEDURE — 47563 LAPARO CHOLECYSTECTOMY/GRAPH: CPT | Mod: AS | Performed by: PHYSICIAN ASSISTANT

## 2024-06-01 RX ORDER — HYDROCODONE BITARTRATE AND ACETAMINOPHEN 5; 325 MG/1; MG/1
1 TABLET ORAL
Status: DISCONTINUED | OUTPATIENT
Start: 2024-06-01 | End: 2024-06-01 | Stop reason: HOSPADM

## 2024-06-01 RX ORDER — FENTANYL CITRATE 50 UG/ML
25 INJECTION, SOLUTION INTRAMUSCULAR; INTRAVENOUS EVERY 5 MIN PRN
Status: DISCONTINUED | OUTPATIENT
Start: 2024-06-01 | End: 2024-06-01 | Stop reason: HOSPADM

## 2024-06-01 RX ORDER — CALCIUM CARBONATE 500 MG/1
1000 TABLET, CHEWABLE ORAL 4 TIMES DAILY PRN
Status: DISCONTINUED | OUTPATIENT
Start: 2024-06-01 | End: 2024-06-01 | Stop reason: HOSPADM

## 2024-06-01 RX ORDER — ONDANSETRON 2 MG/ML
4 INJECTION INTRAMUSCULAR; INTRAVENOUS EVERY 6 HOURS PRN
Status: DISCONTINUED | OUTPATIENT
Start: 2024-06-01 | End: 2024-06-01 | Stop reason: HOSPADM

## 2024-06-01 RX ORDER — HYDROMORPHONE HCL IN WATER/PF 6 MG/30 ML
0.2 PATIENT CONTROLLED ANALGESIA SYRINGE INTRAVENOUS
Status: DISCONTINUED | OUTPATIENT
Start: 2024-06-01 | End: 2024-06-01 | Stop reason: HOSPADM

## 2024-06-01 RX ORDER — ONDANSETRON 4 MG/1
4 TABLET, ORALLY DISINTEGRATING ORAL EVERY 8 HOURS PRN
Qty: 20 TABLET | Refills: 0 | Status: SHIPPED | OUTPATIENT
Start: 2024-06-01

## 2024-06-01 RX ORDER — LABETALOL 20 MG/4 ML (5 MG/ML) INTRAVENOUS SYRINGE
PRN
Status: DISCONTINUED | OUTPATIENT
Start: 2024-06-01 | End: 2024-06-01

## 2024-06-01 RX ORDER — HYDROMORPHONE HCL IN WATER/PF 6 MG/30 ML
0.4 PATIENT CONTROLLED ANALGESIA SYRINGE INTRAVENOUS
Status: DISCONTINUED | OUTPATIENT
Start: 2024-06-01 | End: 2024-06-01 | Stop reason: HOSPADM

## 2024-06-01 RX ORDER — HYDROMORPHONE HCL IN WATER/PF 6 MG/30 ML
0.2 PATIENT CONTROLLED ANALGESIA SYRINGE INTRAVENOUS EVERY 5 MIN PRN
Status: DISCONTINUED | OUTPATIENT
Start: 2024-06-01 | End: 2024-06-01 | Stop reason: HOSPADM

## 2024-06-01 RX ORDER — FENTANYL CITRATE 50 UG/ML
INJECTION, SOLUTION INTRAMUSCULAR; INTRAVENOUS PRN
Status: DISCONTINUED | OUTPATIENT
Start: 2024-06-01 | End: 2024-06-01

## 2024-06-01 RX ORDER — LIDOCAINE 40 MG/G
CREAM TOPICAL
Status: DISCONTINUED | OUTPATIENT
Start: 2024-06-01 | End: 2024-06-01 | Stop reason: HOSPADM

## 2024-06-01 RX ORDER — DEXAMETHASONE SODIUM PHOSPHATE 4 MG/ML
4 INJECTION, SOLUTION INTRA-ARTICULAR; INTRALESIONAL; INTRAMUSCULAR; INTRAVENOUS; SOFT TISSUE
Status: DISCONTINUED | OUTPATIENT
Start: 2024-06-01 | End: 2024-06-01 | Stop reason: HOSPADM

## 2024-06-01 RX ORDER — SODIUM CHLORIDE, SODIUM LACTATE, POTASSIUM CHLORIDE, CALCIUM CHLORIDE 600; 310; 30; 20 MG/100ML; MG/100ML; MG/100ML; MG/100ML
INJECTION, SOLUTION INTRAVENOUS CONTINUOUS
Status: DISCONTINUED | OUTPATIENT
Start: 2024-06-01 | End: 2024-06-01 | Stop reason: HOSPADM

## 2024-06-01 RX ORDER — CEFAZOLIN SODIUM/WATER 2 G/20 ML
2 SYRINGE (ML) INTRAVENOUS SEE ADMIN INSTRUCTIONS
Status: DISCONTINUED | OUTPATIENT
Start: 2024-06-01 | End: 2024-06-01 | Stop reason: HOSPADM

## 2024-06-01 RX ORDER — ONDANSETRON 4 MG/1
4 TABLET, ORALLY DISINTEGRATING ORAL EVERY 30 MIN PRN
Status: DISCONTINUED | OUTPATIENT
Start: 2024-06-01 | End: 2024-06-01 | Stop reason: HOSPADM

## 2024-06-01 RX ORDER — HYDROCODONE BITARTRATE AND ACETAMINOPHEN 5; 325 MG/1; MG/1
1-2 TABLET ORAL EVERY 4 HOURS PRN
Qty: 10 TABLET | Refills: 0 | Status: SHIPPED | OUTPATIENT
Start: 2024-06-01

## 2024-06-01 RX ORDER — PROPOFOL 10 MG/ML
INJECTION, EMULSION INTRAVENOUS PRN
Status: DISCONTINUED | OUTPATIENT
Start: 2024-06-01 | End: 2024-06-01

## 2024-06-01 RX ORDER — MULTIVITAMIN
1 TABLET ORAL DAILY
COMMUNITY

## 2024-06-01 RX ORDER — BUPIVACAINE HYDROCHLORIDE 5 MG/ML
INJECTION, SOLUTION EPIDURAL; INTRACAUDAL PRN
Status: DISCONTINUED | OUTPATIENT
Start: 2024-06-01 | End: 2024-06-01 | Stop reason: HOSPADM

## 2024-06-01 RX ORDER — NALOXONE HYDROCHLORIDE 0.4 MG/ML
0.1 INJECTION, SOLUTION INTRAMUSCULAR; INTRAVENOUS; SUBCUTANEOUS
Status: DISCONTINUED | OUTPATIENT
Start: 2024-06-01 | End: 2024-06-01 | Stop reason: HOSPADM

## 2024-06-01 RX ORDER — CETIRIZINE HYDROCHLORIDE 10 MG/1
10 TABLET ORAL DAILY PRN
COMMUNITY

## 2024-06-01 RX ORDER — CEFAZOLIN SODIUM/WATER 2 G/20 ML
2 SYRINGE (ML) INTRAVENOUS
Status: COMPLETED | OUTPATIENT
Start: 2024-06-01 | End: 2024-06-01

## 2024-06-01 RX ORDER — OXYCODONE HYDROCHLORIDE 5 MG/1
5 TABLET ORAL EVERY 4 HOURS PRN
Status: DISCONTINUED | OUTPATIENT
Start: 2024-06-01 | End: 2024-06-01 | Stop reason: HOSPADM

## 2024-06-01 RX ORDER — MAGNESIUM HYDROXIDE/ALUMINUM HYDROXICE/SIMETHICONE 120; 1200; 1200 MG/30ML; MG/30ML; MG/30ML
30 SUSPENSION ORAL EVERY 4 HOURS PRN
Status: DISCONTINUED | OUTPATIENT
Start: 2024-06-01 | End: 2024-06-01 | Stop reason: HOSPADM

## 2024-06-01 RX ORDER — DEXAMETHASONE SODIUM PHOSPHATE 4 MG/ML
INJECTION, SOLUTION INTRA-ARTICULAR; INTRALESIONAL; INTRAMUSCULAR; INTRAVENOUS; SOFT TISSUE PRN
Status: DISCONTINUED | OUTPATIENT
Start: 2024-06-01 | End: 2024-06-01

## 2024-06-01 RX ORDER — AMOXICILLIN 250 MG
2 CAPSULE ORAL 2 TIMES DAILY PRN
Status: DISCONTINUED | OUTPATIENT
Start: 2024-06-01 | End: 2024-06-01 | Stop reason: HOSPADM

## 2024-06-01 RX ORDER — EPHEDRINE SULFATE 50 MG/ML
25 INJECTION, SOLUTION INTRAVENOUS ONCE
Status: COMPLETED | OUTPATIENT
Start: 2024-06-01 | End: 2024-06-01

## 2024-06-01 RX ORDER — ONDANSETRON 4 MG/1
4 TABLET, ORALLY DISINTEGRATING ORAL EVERY 6 HOURS PRN
Status: DISCONTINUED | OUTPATIENT
Start: 2024-06-01 | End: 2024-06-01 | Stop reason: HOSPADM

## 2024-06-01 RX ORDER — LIDOCAINE HYDROCHLORIDE 20 MG/ML
INJECTION, SOLUTION INFILTRATION; PERINEURAL PRN
Status: DISCONTINUED | OUTPATIENT
Start: 2024-06-01 | End: 2024-06-01

## 2024-06-01 RX ORDER — PROMETHAZINE HYDROCHLORIDE 25 MG/ML
25 INJECTION INTRAMUSCULAR; INTRAVENOUS ONCE
Status: COMPLETED | OUTPATIENT
Start: 2024-06-01 | End: 2024-06-01

## 2024-06-01 RX ORDER — PROCHLORPERAZINE MALEATE 5 MG
10 TABLET ORAL EVERY 6 HOURS PRN
Status: DISCONTINUED | OUTPATIENT
Start: 2024-06-01 | End: 2024-06-01 | Stop reason: HOSPADM

## 2024-06-01 RX ORDER — ONDANSETRON 2 MG/ML
4 INJECTION INTRAMUSCULAR; INTRAVENOUS EVERY 30 MIN PRN
Status: DISCONTINUED | OUTPATIENT
Start: 2024-06-01 | End: 2024-06-01 | Stop reason: HOSPADM

## 2024-06-01 RX ORDER — AMOXICILLIN 250 MG
1 CAPSULE ORAL 2 TIMES DAILY PRN
Status: DISCONTINUED | OUTPATIENT
Start: 2024-06-01 | End: 2024-06-01 | Stop reason: HOSPADM

## 2024-06-01 RX ORDER — FENTANYL CITRATE 50 UG/ML
50 INJECTION, SOLUTION INTRAMUSCULAR; INTRAVENOUS EVERY 5 MIN PRN
Status: DISCONTINUED | OUTPATIENT
Start: 2024-06-01 | End: 2024-06-01 | Stop reason: HOSPADM

## 2024-06-01 RX ORDER — PROCHLORPERAZINE 25 MG
25 SUPPOSITORY, RECTAL RECTAL EVERY 12 HOURS PRN
Status: DISCONTINUED | OUTPATIENT
Start: 2024-06-01 | End: 2024-06-01 | Stop reason: HOSPADM

## 2024-06-01 RX ORDER — ONDANSETRON 2 MG/ML
INJECTION INTRAMUSCULAR; INTRAVENOUS PRN
Status: DISCONTINUED | OUTPATIENT
Start: 2024-06-01 | End: 2024-06-01

## 2024-06-01 RX ORDER — GLYCOPYRROLATE 0.2 MG/ML
INJECTION, SOLUTION INTRAMUSCULAR; INTRAVENOUS PRN
Status: DISCONTINUED | OUTPATIENT
Start: 2024-06-01 | End: 2024-06-01

## 2024-06-01 RX ORDER — INDOCYANINE GREEN AND WATER 25 MG
2.5 KIT INJECTION ONCE
Status: COMPLETED | OUTPATIENT
Start: 2024-06-01 | End: 2024-06-01

## 2024-06-01 RX ORDER — HYDROMORPHONE HCL IN WATER/PF 6 MG/30 ML
0.4 PATIENT CONTROLLED ANALGESIA SYRINGE INTRAVENOUS EVERY 5 MIN PRN
Status: DISCONTINUED | OUTPATIENT
Start: 2024-06-01 | End: 2024-06-01 | Stop reason: HOSPADM

## 2024-06-01 RX ORDER — FLUTICASONE PROPIONATE 50 MCG
1 SPRAY, SUSPENSION (ML) NASAL DAILY PRN
COMMUNITY

## 2024-06-01 RX ADMIN — ONDANSETRON 4 MG: 2 INJECTION INTRAMUSCULAR; INTRAVENOUS at 11:27

## 2024-06-01 RX ADMIN — GLYCOPYRROLATE 0.2 MG: 0.2 INJECTION, SOLUTION INTRAMUSCULAR; INTRAVENOUS at 11:15

## 2024-06-01 RX ADMIN — SODIUM CHLORIDE, POTASSIUM CHLORIDE, SODIUM LACTATE AND CALCIUM CHLORIDE: 600; 310; 30; 20 INJECTION, SOLUTION INTRAVENOUS at 01:30

## 2024-06-01 RX ADMIN — INDOCYANINE GREEN AND WATER 2.5 MG: KIT at 10:19

## 2024-06-01 RX ADMIN — FENTANYL CITRATE 100 MCG: 50 INJECTION INTRAMUSCULAR; INTRAVENOUS at 11:21

## 2024-06-01 RX ADMIN — LIDOCAINE HYDROCHLORIDE 40 MG: 20 INJECTION, SOLUTION INFILTRATION; PERINEURAL at 11:21

## 2024-06-01 RX ADMIN — CALCIUM CARBONATE (ANTACID) CHEW TAB 500 MG 1000 MG: 500 CHEW TAB at 01:43

## 2024-06-01 RX ADMIN — SODIUM CHLORIDE, POTASSIUM CHLORIDE, SODIUM LACTATE AND CALCIUM CHLORIDE: 600; 310; 30; 20 INJECTION, SOLUTION INTRAVENOUS at 10:19

## 2024-06-01 RX ADMIN — LABETALOL 20 MG/4 ML (5 MG/ML) INTRAVENOUS SYRINGE 10 MG: at 11:47

## 2024-06-01 RX ADMIN — HYDROMORPHONE HYDROCHLORIDE 1 MG: 1 INJECTION, SOLUTION INTRAMUSCULAR; INTRAVENOUS; SUBCUTANEOUS at 11:26

## 2024-06-01 RX ADMIN — EPHEDRINE SULFATE 25 MG: 50 INJECTION, SOLUTION INTRAVENOUS at 14:39

## 2024-06-01 RX ADMIN — FENTANYL CITRATE 25 MCG: 50 INJECTION, SOLUTION INTRAMUSCULAR; INTRAVENOUS at 12:46

## 2024-06-01 RX ADMIN — FENTANYL CITRATE 25 MCG: 50 INJECTION, SOLUTION INTRAMUSCULAR; INTRAVENOUS at 12:40

## 2024-06-01 RX ADMIN — Medication 2 G: at 11:15

## 2024-06-01 RX ADMIN — PROPOFOL 200 MG: 10 INJECTION, EMULSION INTRAVENOUS at 11:21

## 2024-06-01 RX ADMIN — ROCURONIUM BROMIDE 50 MG: 50 INJECTION, SOLUTION INTRAVENOUS at 11:21

## 2024-06-01 RX ADMIN — DEXAMETHASONE SODIUM PHOSPHATE 8 MG: 4 INJECTION, SOLUTION INTRA-ARTICULAR; INTRALESIONAL; INTRAMUSCULAR; INTRAVENOUS; SOFT TISSUE at 11:21

## 2024-06-01 RX ADMIN — MIDAZOLAM 2 MG: 1 INJECTION INTRAMUSCULAR; INTRAVENOUS at 11:15

## 2024-06-01 RX ADMIN — SUGAMMADEX 200 MG: 100 INJECTION, SOLUTION INTRAVENOUS at 12:06

## 2024-06-01 RX ADMIN — PROMETHAZINE HYDROCHLORIDE 25 MG: 25 INJECTION INTRAMUSCULAR; INTRAVENOUS at 14:39

## 2024-06-01 RX ADMIN — FENTANYL CITRATE 25 MCG: 50 INJECTION, SOLUTION INTRAMUSCULAR; INTRAVENOUS at 13:20

## 2024-06-01 RX ADMIN — FENTANYL CITRATE 25 MCG: 50 INJECTION, SOLUTION INTRAMUSCULAR; INTRAVENOUS at 13:42

## 2024-06-01 RX ADMIN — SODIUM CHLORIDE, POTASSIUM CHLORIDE, SODIUM LACTATE AND CALCIUM CHLORIDE: 600; 310; 30; 20 INJECTION, SOLUTION INTRAVENOUS at 11:15

## 2024-06-01 RX ADMIN — ONDANSETRON 4 MG: 2 INJECTION INTRAMUSCULAR; INTRAVENOUS at 12:43

## 2024-06-01 ASSESSMENT — ACTIVITIES OF DAILY LIVING (ADL)
ADLS_ACUITY_SCORE: 18
ADLS_ACUITY_SCORE: 35
ADLS_ACUITY_SCORE: 18
ADLS_ACUITY_SCORE: 18

## 2024-06-01 NOTE — CONSULTS
Chart check:  I came by to see patient but she is in the OR having  cholecystectomy.  I will check her chart tomorrow and see if consultation is still needed.    She is a 49 year old female with a past medical history significant for obesity who presents with postprandial right upper quadrant abdominal pain.  She is found to have abnormal liver function test with admitting alkaline phosphatase elevated at 183, ALT 1228, , total bilirubin 1.6.  CBC with differential is normal.  Abdominal ultrasound showed cholelithiasis, gallbladder polyps, and hepatic steatosis.  Her liver enzymes a year ago were normal.     I suspect her abnormal liver function test are secondary to cholelithiasis and likely passing a stone.  She does have fatty liver on ultrasound, and this might be minorly contributing to her abnormal liver function test.  I do note that her liver tests were normal a year ago.      Mireille Shea MD  [facility]

## 2024-06-01 NOTE — DISCHARGE INSTRUCTIONS
HOME CARE FOLLOWING LAPAROSCOPIC CHOLECYSTECTOMY  KRIS Ta, MARIEL Gonzáles C. Pratt, J. Shaheen    INCISIONAL CARE:  Replace the bandage over your incisions DAILY until all drainage stops, or if more comfortable to have in place.  If present, leave the steri-strips (white paper tapes) in place for 14 days after surgery.  If Dermabond (a type of skin glue) is present, leave in place until it wears/flakes off (2-3 weeks).     BATHING:  OK to shower 48 hours after surgery.  Avoid baths for 1 week after surgery.  You may wash your hair at any time.  Gently pat your incision dry after bathing.  Do not apply lotions, creams, or ointments to incisions.    ACTIVITY:  Light Activity -- you may immediately be up and about as tolerated.  Walking is encouraged, increase as tolerated.  Driving/Light Work-- when comfortable and off narcotic pain medications.  Strenuous Work/Activity -- limit lifting to 20 pounds for 2 weeks.  Progressively increase with time.  Active Sports (running, biking, etc.) -- cautiously resume after 2 weeks.    DISCOMFORT:  Local anesthetic placed at surgery should provide relief for 4-8 hours.  Begin taking pain pills before discomfort is severe.  Take the pain medication with some food, when possible, to minimize side effects.  Intermittent use of ice packs may help during the first 1-3 weeks after surgery.  Expect gradual improvement.    Over-the-counter anti-inflammatory medications (i.e. Ibuprofen/Advil/Motrin or Naprosyn/Aleve) may be used per package instructions in addition to or while tapering off the narcotic pain medications to decrease swelling and sensitivity.  DO NOT TAKE these Anti-inflammatory medications if your primary physician has advised against doing so, or if you have acid reflux, ulcer, or bleeding disorder, or take blood-thinner medications.  Call your primary physician or the surgery office if you have medication questions.    After laparoscopic  cholecystectomy, you may have shoulder or upper back discomfort due to the gas used during surgery.  This is temporary and should resolve within 2-3 days.  Frequent short walks may help with this.  You may have decreased energy level for 1-2 weeks after surgery related to your recovery.    DIET:  Start with liquids and gradually increase diet as tolerated.  Drink plenty of fluids.  While taking pain medications, consider use of a stool softener, increase your fiber in your diet, or add a fiber supplement (like Metamucil, Citrucel) to help prevent constipation - a possible side effect of pain medications.  It is not uncommon to experience some bowel changes (loose stools or constipation) after surgery.  Your body has to adapt to you no longer having a gall bladder.  To help minimize this side effect, avoid fatty foods for 1-2 weeks after surgery.  You may then slowly increase the amount of fatty foods in your diet.      NAUSEA:  If nauseated from the anesthetic/pain meds; rest in bed, get up cautiously with assistance, and drink clear liquids (juice, tea, broth).    FOLLOW-UP AFTER SURGERY:  -Our office will contact you approximately 2-3 weeks after surgery to check on your progress and answer any questions you may have.  If you are doing well, you will not need to return for an office appointment.  If any concerns are identified over the phone, we will help you make an appointment to see a provider.    -If you have not received a phone call, have any questions or concerns, or would like to be seen, please call us at [phone].  We are located at: [address], [address]; [place] [address]    -CONTACT US IF THE FOLLOWING DEVELOPS:   1. A fever that is above 101     2. Increased redness, warmth, drainage, bleeding, or swelling.   3. Pain that is not relieved by rest/ice and your prescription.   4.  Increasing pain after 48 hours.   5. Drainage that is thick, cloudy, yellow, green or white.   6. Any other  questions or concerns.      FREQUENTLY ASKED QUESTIONS:    Q:  How should my incision look?    A:  Normally your incision will appear slightly swollen with light redness directly along the incision itself as it heals.  It may feel like a bump or ridge as the healing/scarring happens, and over time (3-4 months) this bump or ridge feeling should slowly go away.  In general, clear or pink watery drainage can be normal at first as your incision heals, but should decrease over time.    Q:  How do I know if my incision is infected?  A:  Look at your incision for signs of infection, like redness around the incision spreading to surrounding skin, or drainage of cloudy or foul-smelling drainage.  If you feel warm, check your temperature to see if you are running a fever.    **If any of these things occur, please notify the nurse at our office.  We may need you to come into the office for an incision check.      Q:  How do I take care of my incision?  A:  If you have a dressing in place - Starting the day after surgery, replace the dressing 1-2 times a day until there is no further drainage from the incision.  At that time, a dressing is no longer needed.  Try to minimize tape on the skin if irritation is occurring at the tape sites.  If you have significant irritation from tape on the skin, please call the office to discuss other method of dressing your incision.    Small pieces of tape called  steri-strips  may be present directly overlying your incision; these may be removed 10 days after surgery unless otherwise specified by your surgeon.  If these tapes start to loosen at the ends, you may trim them back until they fall off or are removed.    A:  If you had  Dermabond  tissue glue used as a dressing (this causes your incision to look shiny with a clear covering over it) - This type of dressing wears off with time and does not require more dressings over the top unless it is draining around the glue as it wears off.  Do  not apply ointments or lotions over the incisions until the glue has completely worn off.    Q:  There is a piece of tape or a sticky  lead  still on my skin.  Can I remove this?  A:  Sometimes the sticky  leads  used for monitoring during surgery or for evaluation in the emergency department are not all removed while you are in the hospital.  These sometimes have a tab or metal dot on them.  You can easily remove these on your own, like taking off a band-aid.  If there is a gel substance under the  lead , simply wipe/clean it off with a washcloth or paper towel.      Q:  What can I do to minimize constipation (very hard stools, or lack of stools)?  A:  Stay well hydrated.  Increase your dietary fiber intake or take a fiber supplement -with plenty of water.  Walk around frequently.  You may consider an over-the-counter stool-softener.  Your Pharmacist can assist you with choosing one that is stocked at your pharmacy.  Constipation is also one of the most common side effects of pain medication.  If you are using pain medication, be pro-active and try to PREVENT problems with constipation by taking the steps above BEFORE constipation becomes a problem.    Q:  What do I do if I need more pain medications?  A:  Call the office to receive refills.  Be aware that certain pain meds cannot be called into a pharmacy and actually require a paper prescription.  A change may be made in your pain med as you progress thru your recovery period or if you have side effects to certain meds.    --Pain meds are NOT refilled after 5pm on weekdays, and NOT AT ALL on the weekends, so please look ahead to prevent problems.      Q:  Why am I having a hard time sleeping now that I am at home?  A:  Many medications you receive while you are in the hospital can impact your sleep for a number of days after your surgery/hospitalization.  Decreased level of activity and naps during the day may also make sleeping at night difficult.  Try to  minimize day-time naps, and get up frequently during the day to walk around your home during your recovery time.  Sleep aides may be of some help, but are not recommended for long-term use.      Q:  I am having some back discomfort.  What should I do?  A:  This may be related to certain positioning that was required for your surgery, extended periods of time in bed, or other changes in your overall activity level.  You may try ice, heat, acetaminophen, or ibuprofen to treat this temporarily.  Note that many pain medications have acetaminophen in them and would state this on the prescription bottle.  Be sure not to exceed the maximum of 4000mg per day of acetaminophen.     **If the pain you are having does not resolve, is severe, or is a flare of back pain you have had on other occasions prior to surgery, please contact your primary physician for further recommendations or for an appointment to be examined at their office.    Q:  Why am I having headaches?  A:  Headaches can be caused by many things:  caffeine withdrawal, use of pain meds, dehydration, high blood pressure, lack of sleep, over-activity/exhaustion, flare-up of usual migraine headaches.  If you feel this is related to muscle tension (a band-like feeling around the head, or a pressure at the low-back of the head) you may try ice or heat to this area.  You may need to drink more fluids (try electrolyte drink like Gatorade), rest, or take your usual migraine medications.   **If your headaches do not resolve, worsen, are accompanied by other symptoms, or if your blood pressure is high, please call your primary physician for recommendation and/or examination.    Q:  I am unable to urinate.  What do I do?  A:  A small percentage of people can have difficulty urinating initially after surgery.  This includes being able to urinate only a very small amount at a time and feeling discomfort or pressure in the very low abdomen.  This is called  urinary retention ,  and is actually an urgent situation.  Proceed to your nearest Emergency department for evaluation (not an Urgent Care Center).  Sometimes the bladder does not work correctly after certain medications you receive during surgery, or related to certain procedures.  You may need to have a catheter placed until your bladder recovers.  When planning to go to an Emergency department, it may help to call the ER to let them know you are coming in for this problem after a surgery.  This may help you get in quicker to be evaluated.  **If you have symptoms of a urinary tract infection, please contact your primary physician for the proper evaluation and treatment.          If you have other questions, please call the office Monday thru Friday between 8am and 4:30pm to discuss with the nurse or physician assistant.  #[phone]    There is a surgeon ON CALL on weekday evenings and over the weekend in case of urgent need only, and may be contacted at the same number.    If you are having an emergency, call 911 or proceed to your nearest emergency department.    Dr. Jensen  [facility] [phone]

## 2024-06-01 NOTE — ANESTHESIA CARE TRANSFER NOTE
Patient: Delia Gunn    Procedure: Procedure(s):  CHOLECYSTECTOMY, LAPAROSCOPIC, WITH CHOLANGIOGRAM       Diagnosis: Elevated LFTs [R79.89]  Diagnosis Additional Information: No value filed.    Anesthesia Type:   General     Note:    Oropharynx: oropharynx clear of all foreign objects  Level of Consciousness: drowsy  Oxygen Supplementation: face mask  Level of Supplemental Oxygen (L/min / FiO2): 6  Independent Airway: airway patency satisfactory and stable  Dentition: dentition unchanged  Vital Signs Stable: post-procedure vital signs reviewed and stable  Report to RN Given: handoff report given  Patient transferred to: PACU    Handoff Report: Identifed the Patient, Identified the Reponsible Provider, Reviewed the pertinent medical history, Discussed the surgical course, Reviewed Intra-OP anesthesia mangement and issues during anesthesia, Set expectations for post-procedure period and Allowed opportunity for questions and acknowledgement of understanding      Vitals:  Vitals Value Taken Time   BP     Temp     Pulse 72 06/01/24 1217   Resp 18 06/01/24 1217   SpO2 100 % 06/01/24 1217   Vitals shown include unfiled device data.    Electronically Signed By: LAINE Coughlin CRNA  June 1, 2024  12:18 PM

## 2024-06-01 NOTE — ED PROVIDER NOTES
Emergency Department Note      History of Present Illness     Chief Complaint  Abdominal Pain    HPI  Delia Gunn is a 49 year old female without significant past medical history presenting for 1 week right upper quadrant abdominal pain associated with tea colored urine.   initially had pain starting 7 days ago, made significantly worse with greasy heavy meals little more elderly weekend when she has been avoiding food even thinking about greasy food makes her abdomen hurt more.  She denies fevers or chills.  She noticed dark urine, dark stools over the last few days and then clearing of her stools today making her more concerned.  No prior abdominal surgeries.    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 Olson's     Physical Exam   Patient Vitals for the past 24 hrs:   BP Temp Pulse Resp SpO2 Weight   06/01/24 0050 -- -- -- -- 98 % --   06/01/24 0049 131/83 -- 82 -- -- --   05/31/24 2328 -- -- -- -- 98 % --   05/31/24 2324 (!) 133/91 -- 81 -- -- --   05/31/24 2139 (!) 151/96 96.8  F (36  C) 94 18 100 % 88.9 kg (195 lb 15.8 oz)     Physical Exam  Constitutional: Alert, attentive, GCS 15   Eyes: EOM are normal, anicteric, conjugate gaze  CV: distal extremities warm, well perfused  Chest: Non-labored breathing on RA  GI: Mild epigastric/right upper quadrant tenderness, no distension. No guarding or rebound.     Neurological: Alert, attentive, moving all extremities equally.   Skin: Skin is warm and dry.    Diagnostics   Lab Results   Labs Ordered and Resulted from Time of ED Arrival to Time of ED Departure   COMPREHENSIVE METABOLIC PANEL - Abnormal       Result Value    Sodium 137      Potassium 4.0      Carbon Dioxide (CO2) 21 (*)     Anion Gap 14      Urea Nitrogen 6.3      Creatinine 0.76      GFR Estimate >90      Calcium 9.2      Chloride 102      Glucose 100 (*)     Alkaline Phosphatase 193 (*)      (*)     ALT 1,228 (*)     Protein Total 7.4      Albumin 4.5      Bilirubin Total 1.6 (*)    ROUTINE UA WITH MICROSCOPIC REFLEX TO CULTURE - Abnormal    Color Urine Light Yellow      Appearance Urine Clear      Glucose Urine Negative      Bilirubin Urine Negative      Ketones Urine Negative      Specific Gravity Urine 1.003      Blood Urine Moderate (*)     pH Urine 5.5      Protein Albumin Urine Negative      Urobilinogen Urine Normal      Nitrite Urine Negative      Leukocyte Esterase Urine Negative      Bacteria Urine Few (*)     RBC Urine 0      WBC Urine <1      Squamous Epithelials Urine <1     LIPASE - Normal    Lipase 32     CBC WITH PLATELETS AND DIFFERENTIAL    WBC Count 7.0      RBC Count 4.81      Hemoglobin 14.5      Hematocrit 42.7      MCV 89      MCH 30.1      MCHC 34.0      RDW 12.8      Platelet Count 316      % Neutrophils 62      % Lymphocytes 31      % Monocytes 4      % Eosinophils 2      % Basophils 1      % Immature Granulocytes 0      NRBCs per 100 WBC 0      Absolute Neutrophils 4.3      Absolute Lymphocytes 2.2      Absolute Monocytes 0.3      Absolute Eosinophils 0.2      Absolute Basophils 0.1      Absolute Immature Granulocytes 0.0      Absolute NRBCs 0.0         Imaging  US Abdomen Limited   Final Result   IMPRESSION:   1.  Cholelithiasis. No evidence for acute cholecystitis.   2.  Gallbladder polyps measuring up to 10 mm. Refer to attached follow-up reference.   3.  Hepatic  steatosis.         Gallbladder polyp 10-14 mm: Follow-up US at 6, 12, 24 months. If on follow-up increase of >= 4 mm in <= 12 months - recommend surgical consult. If decrease of >= 4 mm - stop following.      REFERENCE:   Management of Incidentally Detected Gallbladder Polyps: Society of Radiologists in Ultrasound Consensus Conference Recommendations. Radiology 2022; 000:1-12             ED Course    Medications Administered  Medications   lidocaine 1 % 0.1-1 mL (has no administration in time range)   lidocaine (LMX4) cream (has no administration in time range)   sodium chloride (PF) 0.9% PF flush 3 mL (has no administration in time range)   sodium chloride (PF) 0.9% PF flush 3 mL (has no administration in time range)   senna-docusate (SENOKOT-S/PERICOLACE) 8.6-50 MG per tablet 1 tablet (has no administration in time range)     Or   senna-docusate (SENOKOT-S/PERICOLACE) 8.6-50 MG per tablet 2 tablet (has no administration in time range)   calcium carbonate (TUMS) chewable tablet 1,000 mg (has no administration in time range)   lactated ringers infusion (has no administration in time range)   oxyCODONE IR (ROXICODONE) half-tab 2.5 mg (has no administration in time range)   oxyCODONE (ROXICODONE) tablet 5 mg (has no administration in time range)   HYDROmorphone (DILAUDID) injection 0.2 mg (has no administration in time range)   HYDROmorphone (DILAUDID) injection 0.4 mg (has no administration in time range)   melatonin tablet 5 mg (has no administration in time range)   ondansetron (ZOFRAN ODT) ODT tab 4 mg (has no administration in time range)     Or   ondansetron (ZOFRAN) injection 4 mg (has no administration in time range)   prochlorperazine (COMPAZINE) injection 10 mg (has no administration in time range)     Or   prochlorperazine (COMPAZINE) tablet 10 mg (has no administration in time range)     Or   prochlorperazine (COMPAZINE) suppository 25 mg (has no administration in time range)   alum & mag  hydroxide-simethicone (MAALOX) suspension 30 mL (has no administration in time range)       Procedures  Procedures     Discussion of Management  Dr. Cortez, Hospitalist Service     Social Determinants of Health adding to complexity of care  None    ED Course     Medical Decision Making / Diagnosis     MDM  Delia Gunn is a 49 year old female without significant past medical history presenting for 1 week of right upper quadrant postprandial abdominal pain associated with tea colored urine.  She does not have any significant abdominal tenderness currently but has been avoiding food for the last few days with high suspicion of presentation for biliary pathology, less likely acute cholecystitis.  Right upper quadrant ultrasound demonstrates cholelithiasis with gallbladder polyp but no evidence of acute cholecystitis or sonographic evidence of choledocholithiasis.  However, her LFTs are significantly elevated with AST in the 700s, ALT in the 1200s, elevated alk phos and mildly elevated bilirubin.  High suspicion that she likely had a recently passed a gallstone however given her degree of pain, avoidance of food, markedly elevated LFTs we will plan for medicine admission and likely cholecystectomy.  No indication for antibiotics at this time.  Remainder of her abdominal exam is benign, low suspicion for obvious perforation, obstruction, no indication for CT imaging.    Disposition  The patient was admitted to the hospital.     ICD-10 Codes:    ICD-10-CM    1. Elevated LFTs  R79.89       2. Gallstones  K80.20            Mateo Munoz MD  [employer]  12:55 AM 06/01/24          Mateo Munoz MD  06/01/24 0056

## 2024-06-01 NOTE — PHARMACY-ADMISSION MEDICATION HISTORY
Pharmacist Admission Medication History    Admission medication history is complete. The information provided in this note is only as accurate as the sources available at the time of the update.    Information Source(s): Patient via in-person    Pertinent Information:      Changes made to PTA medication list:  Added: MVI, zyrtec, flonase nasal spray  Deleted: augmentin, ibuprofen  Changed: None    Allergies reviewed with patient and updates made in EHR: yes    Medication History Completed By: Kristi Arguelles [facility] 6/1/2024 9:07 AM    PTA Med List   Medication Sig Last Dose    cetirizine (ZYRTEC) 10 MG tablet Take 10 mg by mouth daily as needed for allergies Past Week    fluticasone (FLONASE) 50 MCG/ACT nasal spray Spray 1 spray into both nostrils daily as needed for rhinitis or allergies Past Week    multivitamin w/minerals (MULTI-VITAMIN) tablet Take 1 tablet by mouth daily Past Week at am

## 2024-06-01 NOTE — CONSULTS
General Surgery Consultation    Delia Gunn MRN# [id number]   Age: 49 year old YOB: 1975     Date of Admission:  5/31/2024    Reason for consult:            Abdominal pain, epigastric       Requesting physician:            MARYCRUZ Siegel                Assessment and Plan:   Assessment:   Delia Gunn is a 49 year old female with a likely passed common duct stone.     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      Plan:   I have offered the patient a laparoscopic cholecystectomy with intraoperative cholangiogram.     We have discussed the indication, alternatives, risks and expected recovery.  Specifically we have discussed incisions, scarring, postoperative infections, anesthesia, bleeding, blood transfusion, open conversion, common bile duct injury, injury to intra-abdominal organs, adhesions leading to bowel obstruction, retained common bile duct stone, bile leak, DVT, PE, hernia, post cholecystectomy diarrhea, recovery, postoperative dietary restrictions and physical limitations.  We have discussed the recommended interventions and treatments for these complications.  All questions have been answered to the best of my ability.    She elects to proceed with surgery.                  Chief Complaint:   Abdominal pain, epigastric     History is obtained from the patient.         History of Present Illness:   Delia Gunn is a 49 year old female who presents with epigastric region abdominal pain for the past 1 week.  The pain is intermittent.  She has not had similar pain in the past.  There is an association with eating fatty foods.  Positive for associated symptoms of tea colored urine and pale stools.  She  does not have a history of jaundice.  She  has not had pancreatitis in the past.              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          Past Surgical History:     Past Surgical History:   Procedure Laterality Date    ANOSCOPY  8/3/11    and biopsy of perianal area    HC TOOTH EXTRACTION W/FORCEP      wisdom teeth    PART SIMPLE REMV VULVA  2003    CURLY 3--seen yearly with Dr. Olson's             Social History:     Social History     Tobacco Use    Smoking status: Never    Smokeless tobacco: Never   Substance Use Topics    Alcohol use: Yes     Alcohol/week: 0.0 standard drinks of alcohol     Comment: wine on weekends             Family History:   Family history reviewed and is not pertinent.         Allergies:     Allergies   Allergen Reactions    Coconut Flavor Swelling     Coconut             Medications:     Current Facility-Administered Medications   Medication Dose Route Frequency Provider Last Rate Last Admin    alum & mag hydroxide-simethicone (MAALOX) suspension 30 mL  30 mL Oral Q4H PRN Arnold Cortez MD        calcium carbonate (TUMS) chewable tablet 1,000 mg  1,000 mg Oral 4x Daily PRN Arnold Cortez MD   1,000 mg at 06/01/24 0143    HYDROmorphone (DILAUDID) injection 0.2 mg  0.2 mg Intravenous Q2H PRN Arnold Cortez MD        HYDROmorphone (DILAUDID) injection 0.4 mg  0.4 mg Intravenous Q2H PRN Arnold Cortez MD        lactated ringers infusion   Intravenous Continuous Arnold Cortez  mL/hr at 06/01/24 0130 New Bag at 06/01/24 0130    lidocaine (LMX4) cream   Topical Q1H PRN Arnold Cortez MD        lidocaine 1 % 0.1-1 mL  0.1-1 mL Other Q1H PRN Arnold Cortez MD        melatonin tablet 5 mg  5 mg Oral At Bedtime PRN Arnold Cortez MD        ondansetron (ZOFRAN ODT) ODT tab 4 mg  4 mg Oral Q6H PRN Arnold Cortez MD        Or    ondansetron (ZOFRAN) injection 4 mg  4 mg Intravenous Q6H PRN  "Arnold Cortez MD        oxyCODONE (ROXICODONE) tablet 5 mg  5 mg Oral Q4H PRN Arnold Cortez MD        oxyCODONE IR (ROXICODONE) half-tab 2.5 mg  2.5 mg Oral Q4H PRN Arnold Cortez MD        prochlorperazine (COMPAZINE) injection 10 mg  10 mg Intravenous Q6H PRN Arnold Cortez MD        Or    prochlorperazine (COMPAZINE) tablet 10 mg  10 mg Oral Q6H PRN Arnold Cortez MD        Or    prochlorperazine (COMPAZINE) suppository 25 mg  25 mg Rectal Q12H PRN Arnold Cortez MD        senna-docusate (SENOKOT-S/PERICOLACE) 8.6-50 MG per tablet 1 tablet  1 tablet Oral BID PRN Arnold Cortez MD        Or    senna-docusate (SENOKOT-S/PERICOLACE) 8.6-50 MG per tablet 2 tablet  2 tablet Oral BID PRN Arnold Cortez MD        sodium chloride (PF) 0.9% PF flush 3 mL  3 mL Intracatheter Q8H Arnold Cortez MD   3 mL at 06/01/24 0139    sodium chloride (PF) 0.9% PF flush 3 mL  3 mL Intracatheter q1 min prn Arnold Cortez MD         Current Facility-Administered Medications   Medication Dose Route Frequency Provider Last Rate Last Admin    sodium chloride (PF) 0.9% PF flush 3 mL  3 mL Intracatheter Q8H Arnold Cortez MD   3 mL at 06/01/24 0139            Review of Systems:   The 10 point review of systems is negative other than noted in the HPI.          Physical Exam:   /67 (BP Location: Left arm)   Pulse 76   Temp 97.7  F (36.5  C) (Oral)   Resp 16   Ht 1.676 m (5' 6\")   Wt 88.9 kg (195 lb 15.8 oz)   SpO2 98%   BMI 31.63 kg/m    General - Well developed, well nourished female in no apparent distress  HEENT:  Head normocephalic and atraumatic, pupils equal and round, conjunctivae clear, no scleral icterus, mucous membranes moist, external ears and nose normal  Neck: Supple without thyromegaly or masses  Lymphatic: No cervical, or supraclavicular lymphadenopathy  Lungs: Clear to auscultation bilaterally  Heart: regular rate and rhythm, " no murmurs  Abdomen:   soft, flat, non-distended with mild tenderness noted in the right upper quadrant without Munoz sign. no masses palpated  Extremities: Warm without edema  Neurologic: nonfocal  Psychiatric: Mood and affect appropriate  Skin: Without lesions, rashes, or juandice         Data:     WBC -   Lab Results   Component Value Date    WBC 7.1 06/01/2024       HgB -   Lab Results   Component Value Date    HGB 12.9 06/01/2024       Plt-   Lab Results   Component Value Date     06/01/2024       Liver Function Studies -   Recent Labs   Lab Test 06/01/24  0540   PROTTOTAL 6.2*   ALBUMIN 3.8   BILITOTAL 0.9   ALKPHOS 155*   *   *       Lipase-   Lab Results   Component Value Date    LIPASE 32 05/31/2024         Imaging:  All imaging studies reviewed by me.    Results for orders placed or performed during the hospital encounter of 05/31/24   US Abdomen Limited    Narrative    EXAM: US ABDOMEN LIMITED  LOCATION: [facility]  DATE: 5/31/2024    INDICATION: ? acute cholecystitis vs choledocholithiasis  COMPARISON: None.  TECHNIQUE: Limited abdominal ultrasound.    FINDINGS:    GALLBLADDER: Shadowing gallstone. A few gallbladder polyps, measuring up to 10 mm. No wall thickening, or pericholecystic fluid. Negative sonographic Mnuoz's sign.    BILE DUCTS: No biliary dilatation. The common duct measures 4 mm.    LIVER: Increased echogenicity from diffuse fatty infiltration. No focal mass.    RIGHT KIDNEY: No hydronephrosis.    PANCREAS: The visualized portions are normal.    No ascites.      Impression    IMPRESSION:  1.  Cholelithiasis. No evidence for acute cholecystitis.  2.  Gallbladder polyps measuring up to 10 mm. Refer to attached follow-up reference.  3.  Hepatic steatosis.      Gallbladder polyp 10-14 mm: Follow-up US at 6, 12, 24 months. If on follow-up increase of >= 4 mm in <= 12 months - recommend surgical consult. If decrease of >= 4 mm - stop  following.    REFERENCE:  Management of Incidentally Detected Gallbladder Polyps: Society of Radiologists in Ultrasound Consensus Conference Recommendations. Radiology 2022; 000:1-12            Kirt Jensen MD

## 2024-06-01 NOTE — ANESTHESIA POSTPROCEDURE EVALUATION
Patient: Delia Gunn    Procedure: Procedure(s):  CHOLECYSTECTOMY, LAPAROSCOPIC, WITH CHOLANGIOGRAM       Anesthesia Type:  General    Note:  Disposition: Outpatient   Postop Pain Control: Uneventful            Sign Out: Well controlled pain   PONV: No   Neuro/Psych: Uneventful            Sign Out: Acceptable/Baseline neuro status   Airway/Respiratory: Uneventful            Sign Out: Acceptable/Baseline resp. status   CV/Hemodynamics: Uneventful            Sign Out: Acceptable CV status; No obvious hypovolemia; No obvious fluid overload   Other NRE: NONE   DID A NON-ROUTINE EVENT OCCUR? No           Last vitals:  Vitals Value Taken Time   /76 06/01/24 1345   Temp 97.7  F (36.5  C) 06/01/24 1345   Pulse 84 06/01/24 1347   Resp 19 06/01/24 1347   SpO2 96 % 06/01/24 1345   Vitals shown include unfiled device data.    Electronically Signed By: Ricky Hogan MD  June 1, 2024  2:33 PM

## 2024-06-01 NOTE — PLAN OF CARE
Patient has black cross body bag, sarah earrings, and her wallet in safe storage to be stored. PACU did not let patient bring belongings with her, bed is being held incase patient can't be discharged. If she can be discharged belongings will be brought down

## 2024-06-01 NOTE — DISCHARGE SUMMARY
"[facility]  Hospitalist Discharge Summary      Date of Admission:  5/31/2024  Date of Discharge:  6/1/2024  Discharging Provider: Mary Siegel PA-C  Discharge Service: Hospitalist Service    Discharge Diagnoses   Abdominal pain due to cholelithiasis s/p cholecystectomy    Clinically Significant Risk Factors     # Obesity: Estimated body mass index is 31.63 kg/m  as calculated from the following:    Height as of this encounter: 1.676 m (5' 6\").    Weight as of this encounter: 88.9 kg (195 lb 15.8 oz).       Follow-ups Needed After Discharge   Follow-up Appointments     Follow-up and recommended labs and tests       Follow up later this week at primary care provider for liver function   tests to ensure they are improving            Unresulted Labs Ordered in the Past 30 Days of this Admission       No orders found for last 31 day(s).            Discharge Disposition   Discharged to home  Condition at discharge: Stable    Hospital Course   Delia Gunn is a 49 year old female with PMH including obesity who presents with abdominal pain.  For the last 2 weeks or so she has been having intermittent right upper quadrant abdominal pain usually postprandial.  The pain is fairly intense, but is not associate with nausea or vomiting.  She has not had any fevers or chills.  This last weekend did eat a lot of greasy fatty foods and the pain was much worse at that time.  For last few days she has had almost no oral intake as the pain returns each time she eats a small amount of food.  She did notice recently that her urine was very dark tea colored and that her stools were significantly lighter and looser.  She does not use acetaminophen excessively.  She rarely uses alcohol.  She does not take any prescription medications.     History obtained from patient, medical record, and from Dr. Munoz in the emergency department.  CBC within normal limits with WBC 7.  Lipase normal at 32.  BMP within " normal limits except for a bicarb of 21.  Alk phos elevated at 193, bilirubin 1.6, ALT 1228, and .  Abdominal ultrasound shows cholelithiasis without cholecystitis, CBD only 4 mm, gallbladder polyps with largest 10 mm, and hepatic steatosis.  Currently not having any pain.      The following day liver function tests are improving.  General surgery consulted and performed cholecystectomy on 6/1/2024.  Intraoperative cholangiogram was negative and given improvement of LFTs it was recommended she discharge home with follow-up for repeat LFTs later this week.        Consultations This Hospital Stay   GASTROENTEROLOGY IP CONSULT  SURGERY GENERAL IP CONSULT    Code Status   Full Code    Time Spent on this Encounter   IMary PA-C, personally saw the patient today and spent less than or equal to 30 minutes discharging this patient.       Mary Siegel PA-C  [facility]  [address] [facility] [address]  Phone: [phone]  Fax: [phone]  ______________________________________________________________________    Physical Exam   Vital Signs: Temp: 96.8  F (36  C) Temp src: Temporal BP: 126/79 Pulse: 80   Resp: 14 SpO2: 100 % O2 Device: Simple face mask Oxygen Delivery: 6 LPM  Weight: 195 lbs 15.82 oz  General Appearance: Alert and oriented x 3  Respiratory: Clear to auscultation bilaterally  Cardiovascular: RRR without murmur  GI: Bowel sounds are present without tenderness  Skin: No rashes or open sores are noted         Primary Care Physician   [facility]    Discharge Orders      Reason for your hospital stay    You were admitted for concerns of right upper quadrant abdominal pain for multiple weeks. We did an ultrasound of you gallbladder which was concerning for gallstones which was likely the cause of your pain. Your liver function tests were also elevated likely due to passing a gallstone through your biliary  tree.    You were seen by general surgery who removed your gallbladder without complication on 6/1/2024. There was no evidence of stone in the biliary tract. Please follow general surgery instructions at discharge.     Follow-up and recommended labs and tests     Follow up later this week at primary care provider for liver function tests to ensure they are improving     Activity    Your activity upon discharge: Activity as recommended by surgery     Diet    Follow this diet upon discharge: Diet as recommended by surgery       Significant Results and Procedures   Most Recent 3 CBC's:  Recent Labs   Lab Test 06/01/24  0540 05/31/24 2216 02/22/23  1251   WBC 7.1 7.0 8.2   HGB 12.9 14.5 14.7   MCV 89 89 88    316 345     Most Recent 3 BMP's:  Recent Labs   Lab Test 06/01/24  0540 05/31/24 2216 02/22/23  1251    137 141   POTASSIUM 3.9 4.0 4.2   CHLORIDE 106 102 104   CO2 22 21* 20*   BUN 5.6* 6.3 9.6   CR 0.72 0.76 0.78   ANIONGAP 12 14 17*   ANNE 8.8 9.2 9.9   GLC 94 100* 88     Most Recent 2 LFT's:  Recent Labs   Lab Test 06/01/24  0540 05/31/24  2216   * 758*   * 1,228*   ALKPHOS 155* 193*   BILITOTAL 0.9 1.6*   ,   Results for orders placed or performed during the hospital encounter of 05/31/24   US Abdomen Limited    Narrative    EXAM: US ABDOMEN LIMITED  LOCATION: [facility]  DATE: 5/31/2024    INDICATION: ? acute cholecystitis vs choledocholithiasis  COMPARISON: None.  TECHNIQUE: Limited abdominal ultrasound.    FINDINGS:    GALLBLADDER: Shadowing gallstone. A few gallbladder polyps, measuring up to 10 mm. No wall thickening, or pericholecystic fluid. Negative sonographic Munoz's sign.    BILE DUCTS: No biliary dilatation. The common duct measures 4 mm.    LIVER: Increased echogenicity from diffuse fatty infiltration. No focal mass.    RIGHT KIDNEY: No hydronephrosis.    PANCREAS: The visualized portions are normal.    No ascites.      Impression     IMPRESSION:  1.  Cholelithiasis. No evidence for acute cholecystitis.  2.  Gallbladder polyps measuring up to 10 mm. Refer to attached follow-up reference.  3.  Hepatic steatosis.      Gallbladder polyp 10-14 mm: Follow-up US at 6, 12, 24 months. If on follow-up increase of >= 4 mm in <= 12 months - recommend surgical consult. If decrease of >= 4 mm - stop following.    REFERENCE:  Management of Incidentally Detected Gallbladder Polyps: Society of Radiologists in Ultrasound Consensus Conference Recommendations. Radiology 2022; 000:1-12         Discharge Medications   Current Discharge Medication List        START taking these medications    Details   HYDROcodone-acetaminophen (NORCO) 5-325 MG tablet Take 1-2 tablets by mouth every 4 hours as needed for moderate to severe pain  Qty: 10 tablet, Refills: 0    Associated Diagnoses: Acute cholecystitis           CONTINUE these medications which have NOT CHANGED    Details   cetirizine (ZYRTEC) 10 MG tablet Take 10 mg by mouth daily as needed for allergies      fluticasone (FLONASE) 50 MCG/ACT nasal spray Spray 1 spray into both nostrils daily as needed for rhinitis or allergies      multivitamin w/minerals (MULTI-VITAMIN) tablet Take 1 tablet by mouth daily           Allergies   Allergies   Allergen Reactions    Coconut Flavor Swelling     Coconut

## 2024-06-01 NOTE — PLAN OF CARE
"  Pt is A&Ox4. VSS on RA. Independent in room. NPO. LR running @ 100 ml/hr.  Tums given for heartburn. Consults: GI/Surgery.     Goal Outcome Evaluation:      Plan of Care Reviewed With: patient    Overall Patient Progress: improving  Outcome Evaluation: Tums given for heartburn. NPO. Pt denies abd pain.      Problem: Adult Inpatient Plan of Care  Goal: Plan of Care Review  Description: The Plan of Care Review/Shift note should be completed every shift.  The Outcome Evaluation is a brief statement about your assessment that the patient is improving, declining, or no change.  This information will be displayed automatically on your shift  note.  Outcome: Progressing  Flowsheets (Taken 6/1/2024 0554)  Outcome Evaluation: Tums given for heartburn. NPO. Pt denies abd pain.  Plan of Care Reviewed With: patient  Overall Patient Progress: improving  Goal: Patient-Specific Goal (Individualized)  Description: You can add care plan individualizations to a care plan. Examples of Individualization might be:  \"Parent requests to be called daily at 9am for status\", \"I have a hard time hearing out of my right ear\", or \"Do not touch me to wake me up as it startles  me\".  Outcome: Progressing  Goal: Absence of Hospital-Acquired Illness or Injury  Outcome: Progressing  Intervention: Identify and Manage Fall Risk  Recent Flowsheet Documentation  Taken 6/1/2024 0112 by Brii Butt, RN  Safety Promotion/Fall Prevention: safety round/check completed  Intervention: Prevent Skin Injury  Recent Flowsheet Documentation  Taken 6/1/2024 0112 by Brii Butt, RN  Body Position: position changed independently  Goal: Optimal Comfort and Wellbeing  Outcome: Progressing  Goal: Readiness for Transition of Care  Outcome: Progressing  Intervention: Mutually Develop Transition Plan  Recent Flowsheet Documentation  Taken 6/1/2024 0100 by Brii Butt, RN  Equipment Currently Used at Home: none     "

## 2024-06-01 NOTE — ED NOTES
[facility]  ED Nurse Handoff Report    ED Chief complaint: Abdominal Pain  . ED Diagnosis:   Final diagnoses:   None       Allergies:   Allergies   Allergen Reactions    Coconut Flavor Swelling     Coconut       Code Status: Full Code    Activity level - Baseline/Home:  independent.  Activity Level - Current:   independent.   Lift room needed: No.   Bariatric: No   Needed: No   Isolation: No.   Infection: Not Applicable.     Respiratory status: Room air    Vital Signs (within 30 minutes):   Vitals:    05/31/24 2139 05/31/24 2324 05/31/24 2328   BP: (!) 151/96 (!) 133/91    Pulse: 94 81    Resp: 18     Temp: 96.8  F (36  C)     SpO2: 100%  98%   Weight: 88.9 kg (195 lb 15.8 oz)         Cardiac Rhythm:  ,      Pain level:    Patient confused: No.   Patient Falls Risk: nonskid shoes/slippers when out of bed.   Elimination Status: Has voided     Patient Report - Initial Complaint: Having tea colored urine. RUQ pain that started last weekend. Stool is changing to a light grey color. Denies NV.     .   Focused Assessment: A&OX4. VERY pleasant and cooperative. Up ab shantell. Line, and lab and imagining completed.      Abnormal Results:   Labs Ordered and Resulted from Time of ED Arrival to Time of ED Departure   COMPREHENSIVE METABOLIC PANEL - Abnormal       Result Value    Sodium 137      Potassium 4.0      Carbon Dioxide (CO2) 21 (*)     Anion Gap 14      Urea Nitrogen 6.3      Creatinine 0.76      GFR Estimate >90      Calcium 9.2      Chloride 102      Glucose 100 (*)     Alkaline Phosphatase 193 (*)      (*)     ALT 1,228 (*)     Protein Total 7.4      Albumin 4.5      Bilirubin Total 1.6 (*)    ROUTINE UA WITH MICROSCOPIC REFLEX TO CULTURE - Abnormal    Color Urine Light Yellow      Appearance Urine Clear      Glucose Urine Negative      Bilirubin Urine Negative      Ketones Urine Negative      Specific Gravity Urine 1.003      Blood Urine Moderate (*)     pH Urine 5.5       Protein Albumin Urine Negative      Urobilinogen Urine Normal      Nitrite Urine Negative      Leukocyte Esterase Urine Negative      Bacteria Urine Few (*)     RBC Urine 0      WBC Urine <1      Squamous Epithelials Urine <1     LIPASE - Normal    Lipase 32     CBC WITH PLATELETS AND DIFFERENTIAL    WBC Count 7.0      RBC Count 4.81      Hemoglobin 14.5      Hematocrit 42.7      MCV 89      MCH 30.1      MCHC 34.0      RDW 12.8      Platelet Count 316      % Neutrophils 62      % Lymphocytes 31      % Monocytes 4      % Eosinophils 2      % Basophils 1      % Immature Granulocytes 0      NRBCs per 100 WBC 0      Absolute Neutrophils 4.3      Absolute Lymphocytes 2.2      Absolute Monocytes 0.3      Absolute Eosinophils 0.2      Absolute Basophils 0.1      Absolute Immature Granulocytes 0.0      Absolute NRBCs 0.0          US Abdomen Limited   Final Result   IMPRESSION:   1.  Cholelithiasis. No evidence for acute cholecystitis.   2.  Gallbladder polyps measuring up to 10 mm. Refer to attached follow-up reference.   3.  Hepatic steatosis.         Gallbladder polyp 10-14 mm: Follow-up US at 6, 12, 24 months. If on follow-up increase of >= 4 mm in <= 12 months - recommend surgical consult. If decrease of >= 4 mm - stop following.      REFERENCE:   Management of Incidentally Detected Gallbladder Polyps: Society of Radiologists in Ultrasound Consensus Conference Recommendations. Radiology 2022; 000:1-12             Treatments provided: see MAR / chart / results.   Family Comments: NA. Patient has her phone and has been in contact with family.   OBS brochure/video discussed/provided to patient:  No  ED Medications: Medications - No data to display    Drips infusing:  No  For the majority of the shift this patient was Green.   Interventions performed were see MAR / chart / results.    Sepsis treatment initiated: No    Cares/treatment/interventions/medications to be completed following ED care: NA    ED Nurse Name:  Bridgett Moon RN  11:43 PM  RECEIVING UNIT ED HANDOFF REVIEW    Above ED Nurse Handoff Report was reviewed: Yes  Reviewed by: Brii Butt RN on June 1, 2024 at 12:57 AM

## 2024-06-01 NOTE — PLAN OF CARE
"Patient is Aox4, VSS, LCTA, BS active. She is independent in room, had a lap art today. Will be discharging downstairs from PACU, she has all of her belongings.     Problem: Adult Inpatient Plan of Care  Goal: Plan of Care Review  Description: The Plan of Care Review/Shift note should be completed every shift.  The Outcome Evaluation is a brief statement about your assessment that the patient is improving, declining, or no change.  This information will be displayed automatically on your shift  note.  Outcome: Met  Flowsheets (Taken 6/1/2024 1220)  Outcome Evaluation: Patient discharging from PACU  Plan of Care Reviewed With: patient  Overall Patient Progress: improving  Goal: Patient-Specific Goal (Individualized)  Description: You can add care plan individualizations to a care plan. Examples of Individualization might be:  \"Parent requests to be called daily at 9am for status\", \"I have a hard time hearing out of my right ear\", or \"Do not touch me to wake me up as it startles  me\".  Outcome: Met  Goal: Absence of Hospital-Acquired Illness or Injury  Outcome: Met  Goal: Optimal Comfort and Wellbeing  Outcome: Met  Goal: Readiness for Transition of Care  Outcome: Met     Problem: Pain Acute  Goal: Optimal Pain Control and Function  Outcome: Met  Intervention: Prevent or Manage Pain  Recent Flowsheet Documentation  Taken 6/1/2024 0900 by Svitlana Youssef RN  Medication Review/Management: medications reviewed   Goal Outcome Evaluation:      Plan of Care Reviewed With: patient    Overall Patient Progress: improvingOverall Patient Progress: improving    Outcome Evaluation: Patient discharging from PACU      "

## 2024-06-01 NOTE — ED TRIAGE NOTES
Having tea colored urine. RUQ pain that started last weekend. Stool is changing to a light grey color. Denies NV.

## 2024-06-01 NOTE — ANESTHESIA PREPROCEDURE EVALUATION
Anesthesia Pre-Procedure Evaluation    Patient: Delia Gunn   MRN: [id number] : 1975        Procedure : Procedure(s):  CHOLECYSTECTOMY, LAPAROSCOPIC, WITH CHOLANGIOGRAM          Past Medical History:   Diagnosis Date    Anal intraepithelial neoplasia II (AIN II) 2011    dr Escudero/ [facility] 1 on repeat biopsy--hold off on aldara until after pregnancy    Fibrocystic breast     Obesity     CURLY III (vulvar intraepithelial neoplasia III) 2002    MADAI since      Past Surgical History:   Procedure Laterality Date    ANOSCOPY  8/3/11    and biopsy of perianal area    HC TOOTH EXTRACTION W/FORCEP      wisdom teeth    PART SIMPLE REMV VULVA      CURLY 3--seen yearly with Dr. Olson's      Allergies   Allergen Reactions    Coconut Flavor Swelling     Coconut      Social History     Tobacco Use    Smoking status: Never    Smokeless tobacco: Never   Substance Use Topics    Alcohol use: Yes     Alcohol/week: 0.0 standard drinks of alcohol     Comment: wine on weekends      Wt Readings from Last 1 Encounters:   24 88.9 kg (195 lb 15.8 oz)        Anesthesia Evaluation   Pt has had prior anesthetic. Type: General.    No history of anesthetic complications       ROS/MED HX  ENT/Pulmonary:  - neg pulmonary ROS     Neurologic:  - neg neurologic ROS     Cardiovascular:  - neg cardiovascular ROS     METS/Exercise Tolerance:     Hematologic:  - neg hematologic  ROS     Musculoskeletal:  - neg musculoskeletal ROS     GI/Hepatic:     (+)          cholecystitis/cholelithiasis,          Renal/Genitourinary:  - neg Renal ROS     Endo:     (+)               Obesity,       Psychiatric/Substance Use:  - neg psychiatric ROS     Infectious Disease:  - neg infectious disease ROS     Malignancy:       Other:            Physical Exam    Airway        Mallampati: II   TM distance: > 3 FB   Neck ROM: full   Mouth opening: > 3 cm    Respiratory Devices and Support         Dental       (+) Minor Abnormalities - some fillings, tiny  "chips      Cardiovascular   cardiovascular exam normal          Pulmonary   pulmonary exam normal                OUTSIDE LABS:  CBC:   Lab Results   Component Value Date    WBC 7.1 06/01/2024    WBC 7.0 05/31/2024    HGB 12.9 06/01/2024    HGB 14.5 05/31/2024    HCT 38.1 06/01/2024    HCT 42.7 05/31/2024     06/01/2024     05/31/2024     BMP:   Lab Results   Component Value Date     06/01/2024     05/31/2024    POTASSIUM 3.9 06/01/2024    POTASSIUM 4.0 05/31/2024    CHLORIDE 106 06/01/2024    CHLORIDE 102 05/31/2024    CO2 22 06/01/2024    CO2 21 (L) 05/31/2024    BUN 5.6 (L) 06/01/2024    BUN 6.3 05/31/2024    CR 0.72 06/01/2024    CR 0.76 05/31/2024    GLC 94 06/01/2024     (H) 05/31/2024     COAGS: No results found for: \"PTT\", \"INR\", \"FIBR\"  POC: No results found for: \"BGM\", \"HCG\", \"HCGS\"  HEPATIC:   Lab Results   Component Value Date    ALBUMIN 3.8 06/01/2024    PROTTOTAL 6.2 (L) 06/01/2024     (HH) 06/01/2024     (H) 06/01/2024    ALKPHOS 155 (H) 06/01/2024    BILITOTAL 0.9 06/01/2024     OTHER:   Lab Results   Component Value Date    A1C 5.4 02/22/2023    ANNE 8.8 06/01/2024    LIPASE 32 05/31/2024    TSH 1.10 02/22/2023    SED 5 11/22/2005       Anesthesia Plan    ASA Status:  2       Anesthesia Type: General.     - Airway: ETT   Induction: Intravenous.   Maintenance: Balanced.        Consents    Anesthesia Plan(s) and associated risks, benefits, and realistic alternatives discussed. Questions answered and patient/representative(s) expressed understanding.     - Discussed:     - Discussed with:  Patient      - Extended Intubation/Ventilatory Support Discussed: No.      - Patient is DNR/DNI Status: No     Use of blood products discussed: No .     Postoperative Care    Pain management: IV analgesics, Oral pain medications, Multi-modal analgesia.   PONV prophylaxis: Ondansetron (or other 5HT-3), Dexamethasone or Solumedrol     Comments:               Ricky Quispe " "MD [place]    I have reviewed the pertinent notes and labs in the chart from the past 30 days and (re)examined the patient.  Any updates or changes from those notes are reflected in this note.              # Obesity: Estimated body mass index is 31.63 kg/m  as calculated from the following:    Height as of this encounter: 1.676 m (5' 6\").    Weight as of this encounter: 88.9 kg (195 lb 15.8 oz).      "

## 2024-06-01 NOTE — PLAN OF CARE
ROOM # 208-2    Living Situation Home w/ family  :  Brian    Activity level at baseline: Ind   Activity level on admit: Ind    Who will be transporting you at discharge: Family    Patient registered to observation; given Patient Bill of Rights; given the opportunity to ask questions about observation status and their plan of care.  Patient has been oriented to the observation room, bathroom and call light is in place.    Discussed discharge goals and expectations with patient/family.

## 2024-06-01 NOTE — H&P
[facility]  Hospitalist Admission Note  Name: Delia Gunn    MRN: [id number]  YOB: 1975    Age: 49 year old  Date of admission: 5/31/2024  Primary care provider: Maria Ines - [place], [facility]    Chief Complaint: Abdominal pain    Assessment and Plan:   Cholelithiasis  Possible choledocholithiasis  Hepatic steatosis: For the last 2 weeks she has been having intense intermittent right upper quadrant abdominal pain usually after eating.  No nausea or vomiting.  She noticed dark tea colored urine.  Was having looser stools that turned very light in color.  No fevers or chills.  Pain was much worse the last weekend after eating a lot of greasy foods.  Has had almost nothing to eat the last couple of days due to the pain.  She rarely uses alcohol.  VSS in the ER without fever.  WBC count 7.  Bilirubin is only slightly elevated at 1.6 despite the tea colored urine and lighter colored stools.  Alk phos is 193.  Transaminases quite high with ALT 1228 and .  Abdominal ultrasound shows cholelithiasis without evidence for acute cholecystitis and the CBD is only 4 mm.  She appears to have increased echogenicity of liver likely from hepatic steatosis.  There are also gallbladder polyps noted measuring up to 10 mm.  It is possible she could have had choledocholithiasis and has passed the stone.  Does not appear evidence for acute cholecystitis at this time so antibiotics have not been given.  She does not currently have any pain and she is nontender in the right upper quadrant on exam, however given that she cannot tolerate really any oral intake without the pain returning I do suspect she needs a cholecystectomy while here and IntraOp cholangiograms would be worthwhile.  We also need to follow her LFTs to make sure they are improving.  She has already had hepatitis B surface antigen screening and 2012 that was negative and hepatitis C antibody screening in 2023 that was negative.  She is  not using acetaminophen excessively.  Does not take any prescription medications.  -Consult general surgery for consideration of cholecystectomy and IntraOp cholangiograms  -Consult [facility] given the significant elevated LFTs  -N.p.o.  -LR at 100 mL/h  -CBC and CMP in the morning  -IV Zofran and IV Compazine as needed for nausea  -Oral oxycodone 2.5 mg for moderate and 5 mg for severe pain every 4 hours as needed.  IV Dilaudid 0.2 mg for moderate and 0.4 mg for severe pain every 2 hours as needed.  -Tums/Maalox as needed for heartburn    Obesity: BMI 32.1.    DVT Prophylaxis: Pneumatic Compression Devices  Code Status: Full Code  FEN: N.p.o.,  mL/h  Discharge Dispo: Home  Estimated Disch Date / # of Days until Disch: Admit inpatient as it is before midnight and I suspect that if she does get a cholecystectomy done tomorrow she likely will need to be monitored overnight for repeat LFTs the following day to make sure things are improving.      History of Present Illness:  Delia Gunn is a 49 year old female with PMH including obesity who presents with abdominal pain.  For the last 2 weeks or so she has been having intermittent right upper quadrant abdominal pain usually postprandial.  The pain is fairly intense, but is not associate with nausea or vomiting.  She has not had any fevers or chills.  This last weekend did eat a lot of greasy fatty foods and the pain was much worse at that time.  For last few days she has had almost no oral intake as the pain returns each time she eats a small amount of food.  She did notice recently that her urine was very dark tea colored and that her stools were significantly lighter and looser.  She does not use acetaminophen excessively.  She rarely uses alcohol.  She does not take any prescription medications.    History obtained from patient, medical record, and from Dr. Munoz in the emergency department.  CBC within normal limits with WBC 7.  Lipase normal at 32.  BMP  within normal limits except for a bicarb of 21.  Alk phos elevated at 193, bilirubin 1.6, ALT 1228, and .  Abdominal ultrasound shows cholelithiasis without cholecystitis, CBD only 4 mm, gallbladder polyps with largest 10 mm, and hepatic steatosis.  Currently not having any pain.  Due to the significant elevated LFTs recommend admission as inpatient as she likely will need a cholecystectomy here and follow labs in the next day.       Clinically Significant Risk Factors Present on Admission                                            Past Medical History reviewed:  Past Medical History:   Diagnosis Date    Anal intraepithelial neoplasia II (AIN II) 06/01/2011    dr Escudero/ [facility] 1 on repeat biopsy--hold off on aldara until after pregnancy    Fibrocystic breast     Obesity     CURLY III (vulvar intraepithelial neoplasia III) 01/01/2002    MADAI since     Past Surgical History reviewed:  Past Surgical History:   Procedure Laterality Date    ANOSCOPY  8/3/11    and biopsy of perianal area    HC TOOTH EXTRACTION W/FORCEP      wisdom teeth    PART SIMPLE REMV VULVA  2003    CURLY 3--seen yearly with Dr. Olson's     Social History reviewed:  Social History     Tobacco Use    Smoking status: Never    Smokeless tobacco: Never   Substance Use Topics    Alcohol use: Yes     Alcohol/week: 0.0 standard drinks of alcohol     Comment: wine on weekends     Social History     Social History Narrative    Caffeine intake/servings daily - 0-1    Calcium intake/servings daily - 5-6    Exercise 1-2 times weekly - describe walking    Sunscreen used - Yes    Seatbelts used - Yes    Guns stored in the home - No    Self Breast Exam - Yes    Pap test up to date -  Yes    Eye exam up to date -  Yes    Dental exam up to date -  Yes    DEXA scan up to date -  Not Applicable    Flex Sig/Colonoscopy up to date -  Not Applicable    Mammography up to date -  Not Applicable    Immunizations reviewed and up to date - Yes    Abuse: Current or Past  (Physical, Sexual or Emotional) - No    Do you feel safe in your environment - Yes    Do you cope well with stress - Yes    Do you suffer from insomnia - No    Last updated by: LAUREN KELSEY  1/16/2012                                     Family History reviewed:  Family History   Problem Relation Age of Onset    Hypertension Father     Lipids Father     Lipids Sister     Heart Disease Brother         ablation    Lipids Brother     Throat cancer Maternal Grandmother 40        esophageal. Reported abdominal    C.A.D. Paternal Grandfather         bypass surgery    Cerebrovascular Disease Paternal Grandfather     Breast Cancer Maternal Aunt 44    Melanoma Maternal Uncle 39        melonoma    Lung Cancer Maternal Uncle         lung cancer    Colon Cancer Paternal Aunt 75     Allergies:  Allergies   Allergen Reactions    Coconut Flavor Swelling     Coconut     Medications:  Prior to Admission medications    Medication Sig Last Dose Taking? Auth Provider Long Term End Date   amoxicillin-clavulanate (AUGMENTIN) 875-125 MG tablet Take 1 tablet by mouth 2 times daily   Surjit Salazar MD     Ibuprofen (ADVIL PO)    Reported, Patient       Review of Systems:  A Comprehensive greater than 10 system review of systems was carried out.  Pertinent positives and negatives are noted above.  Otherwise negative.     Physical Exam:  Blood pressure (!) 133/91, pulse 81, temperature 96.8  F (36  C), resp. rate 18, weight 88.9 kg (195 lb 15.8 oz), SpO2 98%, not currently breastfeeding.  Wt Readings from Last 1 Encounters:   05/31/24 88.9 kg (195 lb 15.8 oz)     Exam:  Constitutional: Awake, NAD   Eyes: sclera white   HEENT:  MMM  Respiratory: no respiratory distress, lungs cta bilaterally, no crackles or wheeze  Cardiovascular: RRR.  No murmur   GI: Obese, non-tender, not distended, bowel sounds present  Skin: no rash.  No jaundice  Musculoskeletal/extremities: No edema  Neurologic: A&O, speech clear  Psychiatric: calm, cooperative,  normal affect    Lab and imaging data personally reviewed:  Labs:  Recent Labs   Lab 05/31/24 2216   WBC 7.0   HGB 14.5   HCT 42.7   MCV 89        Recent Labs   Lab 05/31/24 2216      POTASSIUM 4.0   CHLORIDE 102   CO2 21*   ANIONGAP 14   *   BUN 6.3   CR 0.76   GFRESTIMATED >90   ANNE 9.2   PROTTOTAL 7.4   ALBUMIN 4.5   BILITOTAL 1.6*   ALKPHOS 193*   *   ALT 1,228*     Recent Labs   Lab 05/31/24 2216   LIPASE 32     Recent Labs   Lab 05/31/24 2216   COLOR Light Yellow   APPEARANCE Clear   URINEGLC Negative   URINEBILI Negative   URINEKETONE Negative   SG 1.003   UBLD Moderate*   URINEPH 5.5   PROTEIN Negative   NITRITE Negative   LEUKEST Negative   RBCU 0   WBCU <1       Imaging:  Recent Results (from the past 24 hour(s))   US Abdomen Limited    Narrative    EXAM: US ABDOMEN LIMITED  LOCATION: [facility]  DATE: 5/31/2024    INDICATION: ? acute cholecystitis vs choledocholithiasis  COMPARISON: None.  TECHNIQUE: Limited abdominal ultrasound.    FINDINGS:    GALLBLADDER: Shadowing gallstone. A few gallbladder polyps, measuring up to 10 mm. No wall thickening, or pericholecystic fluid. Negative sonographic Munoz's sign.    BILE DUCTS: No biliary dilatation. The common duct measures 4 mm.    LIVER: Increased echogenicity from diffuse fatty infiltration. No focal mass.    RIGHT KIDNEY: No hydronephrosis.    PANCREAS: The visualized portions are normal.    No ascites.      Impression    IMPRESSION:  1.  Cholelithiasis. No evidence for acute cholecystitis.  2.  Gallbladder polyps measuring up to 10 mm. Refer to attached follow-up reference.  3.  Hepatic steatosis.      Gallbladder polyp 10-14 mm: Follow-up US at 6, 12, 24 months. If on follow-up increase of >= 4 mm in <= 12 months - recommend surgical consult. If decrease of >= 4 mm - stop following.    REFERENCE:  Management of Incidentally Detected Gallbladder Polyps: Society of Radiologists in Ultrasound Consensus  Conference Recommendations. Radiology 2022; 000:1-12         Arnold Cortez MD  Hospitalist  [facility]

## 2024-06-01 NOTE — ANESTHESIA PROCEDURE NOTES
Airway       Patient location during procedure: OR       Procedure Start/Stop Times: 6/1/2024 11:23 AM  Staff -        CRNA: Riley Jones APRN CRNA       Performed By: CRNA  Consent for Airway        Urgency: elective  Indications and Patient Condition       Indications for airway management: kulwinder-procedural       Induction type:intravenous       Mask difficulty assessment: 1 - vent by mask    Final Airway Details       Final airway type: endotracheal airway       Successful airway: ETT - single and Oral  Endotracheal Airway Details        ETT size (mm): 7.0       Cuffed: yes       Successful intubation technique: direct laryngoscopy       DL Blade Type: Holden 2       Grade View of Cords: 1       Adjucts: stylet       Position: Right       Measured from: lips       Secured at (cm): 22       Bite block used: None    Post intubation assessment        Placement verified by: capnometry, equal breath sounds and chest rise        Number of attempts at approach: 1       Secured with: tape       Ease of procedure: easy       Dentition: Intact and Unchanged    Medication(s) Administered   Medication Administration Time: 6/1/2024 11:23 AM

## 2024-06-01 NOTE — OP NOTE
General Surgery Operative Note      Pre-operative diagnosis: likely passed a common duct stone   Post-operative diagnosis: same    Procedure: laparoscopic cholecystectomy  intraoperative cholangiogram     Surgeon: Kirt Jensen MD   Assistant(s): Funmi Tapia PA-C  The Physician Assistant was medically necessary for their expertise in prepping, camera management, suctioning, suturing and retraction.   Anesthesia: general    Estimated blood loss:   10 cc     Specimens: gallbladder and contents     DESCRIPTION OF PROCEDURE: The patient was taken to the operating room and placed on the table in supine position. General endotracheal anesthesia was induced and the abdomen was prepped and draped in standard sterile fashion. An incision was made just above the umbilicus with a blade. The incision was carried down to the fascia. The fascia was incised in the midline with a blade. The peritoneum was entered bluntly with a Carmalt clamp. Two interrupted 0 Vicryl sutures were placed at the extremes of this fascial incision. The Karol trocar was introduced and the abdomen was insufflated with CO2. A 5 mm trocar was placed in the subxiphoid position. A 5 mm trocar was placed in the right upper quadrant, just below the costal margin at the midclavicular line. Another was placed at the anterior axillary line just below the costal margin on the right. The patient was placed in reverse Trendelenburg and right side up. The gallbladder appeared grossly normal. The fundus of the gallbladder was grasped and retracted cephalad. The infundibulum was grasped and retracted laterally. The peritoneum over the medial and lateral aspects of the triangle of Calot was taken down with the [place] dissector.  The triangle of Calot was skeletonized, thus obtaining the critical view of safety.  The infundibulum of the gallbladder was grasped with the Marie clamp. Thereby the cholangiogram catheter was introduced and used to canulate the  infundibulum of the gallbladder. The cholangiogram revealed a biliary tree without filling defects; there were two small possible bubbles distally but quick flow of contrast into the duodenum was noted. The radiologist confirmed these findings. The cholangiogram catheter was removed from the abdomen. The duct was thrice clipped and then transected, leaving two clips on the cystic duct stump. The cystic artery was freed up from surrounding tissues. It was clipped twice proximally, once distally and transected with the hook scissors. A thorough evaluation of the triangle of Calot revealed no aberrant ducts or vessels. The gallbladder was then removed from the liver using the hook electrocautery. The gallbladder was passed into an Endocatch bag and removed through the umbilical trocar site. We observed the right upper quadrant carefully for hemostasis. Hemostasis was assured. We irrigated with copious amounts of sterile saline and aspirated the effluent. The right upper quadrant trocar sites were anesthetized with local anesthetic. Each of the trocars was removed under direct visualization. There was no bleeding from any of these sites.  The Karol trocar was removed and the abdomen was evacuated of CO2.  One additional interrupted 0 Vicryl suture was placed in the umbilical trocar site fascia.  Each of the three 0 Vicryl sutures was cinched down and tied. The skin of the umbilical incision was anesthetized with local anesthetic.  All of the incisions were closed with interrupted 4-0 Vicryl subcuticular sutures and Dermabond.  The patient tolerated the procedure well.  Sponge and instrument counts were correct.   Kirt Hurley MD

## 2024-06-01 NOTE — PLAN OF CARE
Brought patient's belongings down to PACU. Hospitalist put in discharge orders      Plan of Care Reviewed With: patient    Overall Patient Progress: improvingOverall Patient Progress: improving    Outcome Evaluation: Patient discharging from PACU

## 2024-06-03 ENCOUNTER — PATIENT OUTREACH (OUTPATIENT)
Dept: FAMILY MEDICINE | Facility: CLINIC | Age: 49
End: 2024-06-03
Payer: COMMERCIAL

## 2024-06-03 NOTE — TELEPHONE ENCOUNTER
Transitions of Care Outreach  Chief Complaint   Patient presents with    Hospital F/U       Most Recent Admission Date: 5/31/2024   Most Recent Admission Diagnosis: Gallstones - K80.20  Elevated LFTs - R79.89     Most Recent Discharge Date: 6/1/2024   Most Recent Discharge Diagnosis: Elevated LFTs - R79.89  Gallstones - K80.20  Acute cholecystitis - K81.0     Transitions of Care Assessment    Discharge Assessment  How are you doing now that you are home?: Doing good, looking to go back to work on Wednesday  How are your symptoms? (Red Flag symptoms escalate to triage hotline per guidelines): Improved  Do you know how to contact your clinic care team if you have future questions or changes to your health status? : Yes  Does the patient have their discharge instructions? : Yes  Does the patient have questions regarding their discharge instructions? : No  Were you started on any new medications or were there changes to any of your previous medications? : No  Does the patient have all of their medications?: Yes  Do you have questions regarding any of your medications? : No  Do you have all of your needed medical supplies or equipment (DME)?  (i.e. oxygen tank, CPAP, cane, etc.): Yes    Follow up Plan     Discharge Follow-Up  Discharge follow up appointment scheduled in alignment with recommended follow up timeframe or Transitions of Risk Category? (Low = within 30 days; Moderate= within 14 days; High= within 7 days): Yes  Discharge Follow Up Appointment Date: 06/05/24 (Per Surgeon, wanted followup with PCP and LFT labs)  Discharge Follow Up Appointment Scheduled with?: Primary Care Provider    Future Appointments   Date Time Provider Department Center   6/5/2024  5:30 PM Tg Soni MD [facility]       Outpatient Plan as outlined on AVS reviewed with patient.    For any urgent concerns, please contact our 24 hour nurse triage line: [phone] ([phone])       Jason Devine RN

## 2024-06-04 PROCEDURE — 88304 TISSUE EXAM BY PATHOLOGIST: CPT | Mod: 26 | Performed by: PATHOLOGY

## 2024-06-05 ENCOUNTER — VIRTUAL VISIT (OUTPATIENT)
Dept: FAMILY MEDICINE | Facility: CLINIC | Age: 49
End: 2024-06-05
Payer: COMMERCIAL

## 2024-06-05 DIAGNOSIS — Z90.49 HISTORY OF LAPAROSCOPIC CHOLECYSTECTOMY: ICD-10-CM

## 2024-06-05 DIAGNOSIS — R74.02 NONSPECIFIC ELEVATION OF LEVELS OF TRANSAMINASE AND LACTIC ACID DEHYDROGENASE (LDH): Primary | ICD-10-CM

## 2024-06-05 DIAGNOSIS — R74.01 NONSPECIFIC ELEVATION OF LEVELS OF TRANSAMINASE AND LACTIC ACID DEHYDROGENASE (LDH): Primary | ICD-10-CM

## 2024-06-05 PROCEDURE — 99213 OFFICE O/P EST LOW 20 MIN: CPT | Mod: 95 | Performed by: FAMILY MEDICINE

## 2024-06-05 NOTE — PROGRESS NOTES
"Delia is a 49 year old who is being evaluated via a billable video visit.    How would you like to obtain your AVS? MyChart  If the video visit is dropped, the invitation should be resent by: Text to cell phone: [phone]  Will anyone else be joining your video visit? No      Assessment & Plan     Delia was seen today for hospital f/u.    Diagnoses and all orders for this visit:    Nonspecific elevation of levels of transaminase and lactic acid dehydrogenase (LDH)  History of laparoscopic cholecystectomy  Patient presented to the emergency room on May 31, 2024 with right upper quadrant pain.  Labs and imaging studies were consistent with very acute cholecystitis.  She underwent a laparoscopic cholecystectomy without complications.  Her initial liver enzymes were significantly elevated and they decreased slightly at the time of her discharge.  Patient is due for hepatic panel  -     Comprehensive metabolic panel (BMP + Alb, Alk Phos, ALT, AST, Total. Bili, TP); Standing        MED REC REQUIRED  Post Medication Reconciliation Status:  Discharge medications reconciled, continue medications without change  BMI  Estimated body mass index is 31.63 kg/m  as calculated from the following:    Height as of 6/1/24: 1.676 m (5' 6\").    Weight as of 5/31/24: 88.9 kg (195 lb 15.8 oz).       Subjective   Delia is a 49 year old, presenting for the following health issues:  Hospital F/U  HPI        Hospital Follow-up Visit:    Hospital/Nursing Home/IP Rehab Facility: [facility]  Date of Admission: 5/31/2024  Date of Discharge: 6/1/2024  Reason(s) for Admission: Abdominal pain due to cholelithiasis s/p cholecystectomy  Was the patient in the ICU or did the patient experience delirium during hospitalization?  No  Do you have any other stressors you would like to discuss with your provider? Health Concerns - Pt reports concerns for liver health and would like to review history, preventative measures, and labs. "     Problems taking medications regularly:  None  Medication changes since discharge: hydrocodone-acetaminophen 5-325 mg tablets, take 1-2 tablets every 4 hours PRN moderate to severe pain  Problems adhering to non-medication therapy:  Pt reports taking miralax for constipation.     Summary of hospitalization:  [facility] discharge summary reviewed  Diagnostic Tests/Treatments reviewed.  Follow up needed: CMP  Other Healthcare Providers Involved in Patient s Care:         None  Update since discharge: improved.     Patient presented to the emergency room on May 31, 2024 with right upper quadrant pain.  Labs and imaging studies were consistent with very acute cholecystitis.  She underwent a laparoscopic cholecystectomy without complications.  Her initial liver enzymes were significantly elevated and they decreased slightly at the time of her discharge.  Patient is due for hepatic panel         Review of Systems  Constitutional, neuro, ENT, endocrine, pulmonary, cardiac, gastrointestinal, genitourinary, musculoskeletal, integument and psychiatric systems are negative, except as otherwise noted.      Objective    Vitals - Patient Reported  Systolic (Patient Reported):  (Pt denies VS to report at this time)  Pain Score: Severe Pain (6)  Pain Loc: Abdomen      Vitals:  No vitals were obtained today due to virtual visit.    Physical Exam   GENERAL: alert and no distress  EYES: Eyes grossly normal to inspection.  No discharge or erythema, or obvious scleral/conjunctival abnormalities.  RESP: No audible wheeze, cough, or visible cyanosis.    SKIN: Visible skin clear. No significant rash, abnormal pigmentation or lesions.  NEURO: Cranial nerves grossly intact.  Mentation and speech appropriate for age.  PSYCH: Appropriate affect, tone, and pace of words    Admission on 05/31/2024, Discharged on 06/01/2024   Component Date Value Ref Range Status    Sodium 05/31/2024 137  135 - 145 mmol/L Final    Reference  intervals for this test were updated on 09/26/2023 to more accurately reflect our healthy population. There may be differences in the flagging of prior results with similar values performed with this method. Interpretation of those prior results can be made in the context of the updated reference intervals.     Potassium 05/31/2024 4.0  3.4 - 5.3 mmol/L Final    Carbon Dioxide (CO2) 05/31/2024 21 (L)  22 - 29 mmol/L Final    Anion Gap 05/31/2024 14  7 - 15 mmol/L Final    Urea Nitrogen 05/31/2024 6.3  6.0 - 20.0 mg/dL Final    Creatinine 05/31/2024 0.76  0.51 - 0.95 mg/dL Final    GFR Estimate 05/31/2024 >90  >60 mL/min/1.73m2 Final    Calcium 05/31/2024 9.2  8.6 - 10.0 mg/dL Final    Chloride 05/31/2024 102  98 - 107 mmol/L Final    Glucose 05/31/2024 100 (H)  70 - 99 mg/dL Final    Alkaline Phosphatase 05/31/2024 193 (H)  40 - 150 U/L Final    AST 05/31/2024 758 (HH)  0 - 45 U/L Final    Reference intervals for this test were updated on 6/12/2023 to more accurately reflect our healthy population. There may be differences in the flagging of prior results with similar values performed with this method. Interpretation of those prior results can be made in the context of the updated reference intervals.    ALT 05/31/2024 1,228 (HH)  0 - 50 U/L Final    Reference intervals for this test were updated on 6/12/2023 to more accurately reflect our healthy population. There may be differences in the flagging of prior results with similar values performed with this method. Interpretation of those prior results can be made in the context of the updated reference intervals.      Protein Total 05/31/2024 7.4  6.4 - 8.3 g/dL Final    Albumin 05/31/2024 4.5  3.5 - 5.2 g/dL Final    Bilirubin Total 05/31/2024 1.6 (H)  <=1.2 mg/dL Final    Lipase 05/31/2024 32  13 - 60 U/L Final    Color Urine 05/31/2024 Light Yellow  Colorless, Straw, Light Yellow, Yellow Final    Appearance Urine 05/31/2024 Clear  Clear Final    Glucose Urine  05/31/2024 Negative  Negative mg/dL Final    Bilirubin Urine 05/31/2024 Negative  Negative Final    Ketones Urine 05/31/2024 Negative  Negative mg/dL Final    Specific Gravity Urine 05/31/2024 1.003  1.003 - 1.035 Final    Blood Urine 05/31/2024 Moderate (A)  Negative Final    pH Urine 05/31/2024 5.5  5.0 - 7.0 Final    Protein Albumin Urine 05/31/2024 Negative  Negative mg/dL Final    Urobilinogen Urine 05/31/2024 Normal  Normal, 2.0 mg/dL Final    Nitrite Urine 05/31/2024 Negative  Negative Final    Leukocyte Esterase Urine 05/31/2024 Negative  Negative Final    Bacteria Urine 05/31/2024 Few (A)  None Seen /HPF Final    RBC Urine 05/31/2024 0  <=2 /HPF Final    WBC Urine 05/31/2024 <1  <=5 /HPF Final    Squamous Epithelials Urine 05/31/2024 <1  <=1 /HPF Final    WBC Count 05/31/2024 7.0  4.0 - 11.0 10e3/uL Final    RBC Count 05/31/2024 4.81  3.80 - 5.20 10e6/uL Final    Hemoglobin 05/31/2024 14.5  11.7 - 15.7 g/dL Final    Hematocrit 05/31/2024 42.7  35.0 - 47.0 % Final    MCV 05/31/2024 89  78 - 100 fL Final    MCH 05/31/2024 30.1  26.5 - 33.0 pg Final    MCHC 05/31/2024 34.0  31.5 - 36.5 g/dL Final    RDW 05/31/2024 12.8  10.0 - 15.0 % Final    Platelet Count 05/31/2024 316  150 - 450 10e3/uL Final    % Neutrophils 05/31/2024 62  % Final    % Lymphocytes 05/31/2024 31  % Final    % Monocytes 05/31/2024 4  % Final    % Eosinophils 05/31/2024 2  % Final    % Basophils 05/31/2024 1  % Final    % Immature Granulocytes 05/31/2024 0  % Final    NRBCs per 100 WBC 05/31/2024 0  <1 /100 Final    Absolute Neutrophils 05/31/2024 4.3  1.6 - 8.3 10e3/uL Final    Absolute Lymphocytes 05/31/2024 2.2  0.8 - 5.3 10e3/uL Final    Absolute Monocytes 05/31/2024 0.3  0.0 - 1.3 10e3/uL Final    Absolute Eosinophils 05/31/2024 0.2  0.0 - 0.7 10e3/uL Final    Absolute Basophils 05/31/2024 0.1  0.0 - 0.2 10e3/uL Final    Absolute Immature Granulocytes 05/31/2024 0.0  <=0.4 10e3/uL Final    Absolute NRBCs 05/31/2024 0.0  10e3/uL Final     Hold Specimen 05/31/2024 [facility]   Final    Hold Specimen 05/31/2024 [facility]   Final    Sodium 06/01/2024 140  135 - 145 mmol/L Final    Reference intervals for this test were updated on 09/26/2023 to more accurately reflect our healthy population. There may be differences in the flagging of prior results with similar values performed with this method. Interpretation of those prior results can be made in the context of the updated reference intervals.     Potassium 06/01/2024 3.9  3.4 - 5.3 mmol/L Final    Carbon Dioxide (CO2) 06/01/2024 22  22 - 29 mmol/L Final    Anion Gap 06/01/2024 12  7 - 15 mmol/L Final    Urea Nitrogen 06/01/2024 5.6 (L)  6.0 - 20.0 mg/dL Final    Creatinine 06/01/2024 0.72  0.51 - 0.95 mg/dL Final    GFR Estimate 06/01/2024 >90  >60 mL/min/1.73m2 Final    Calcium 06/01/2024 8.8  8.6 - 10.0 mg/dL Final    Chloride 06/01/2024 106  98 - 107 mmol/L Final    Glucose 06/01/2024 94  70 - 99 mg/dL Final    Alkaline Phosphatase 06/01/2024 155 (H)  40 - 150 U/L Final    AST 06/01/2024 491 (H)  0 - 45 U/L Final    Reference intervals for this test were updated on 6/12/2023 to more accurately reflect our healthy population. There may be differences in the flagging of prior results with similar values performed with this method. Interpretation of those prior results can be made in the context of the updated reference intervals.    ALT 06/01/2024 912 (HH)  0 - 50 U/L Final    Reference intervals for this test were updated on 6/12/2023 to more accurately reflect our healthy population. There may be differences in the flagging of prior results with similar values performed with this method. Interpretation of those prior results can be made in the context of the updated reference intervals.      Protein Total 06/01/2024 6.2 (L)  6.4 - 8.3 g/dL Final    Albumin 06/01/2024 3.8  3.5 - 5.2 g/dL Final    Bilirubin Total 06/01/2024 0.9  <=1.2 mg/dL Final    WBC Count 06/01/2024 7.1  4.0 - 11.0 10e3/uL Final    RBC Count  06/01/2024 4.27  3.80 - 5.20 10e6/uL Final    Hemoglobin 06/01/2024 12.9  11.7 - 15.7 g/dL Final    Hematocrit 06/01/2024 38.1  35.0 - 47.0 % Final    MCV 06/01/2024 89  78 - 100 fL Final    MCH 06/01/2024 30.2  26.5 - 33.0 pg Final    MCHC 06/01/2024 33.9  31.5 - 36.5 g/dL Final    RDW 06/01/2024 13.0  10.0 - 15.0 % Final    Platelet Count 06/01/2024 261  150 - 450 10e3/uL Final    Case Report 06/01/2024    Final                    Value:Surgical Pathology Report                         Case: [id number]                                  Authorizing Provider:  Kirt Jensen MD Collected:           06/01/2024 11:57 AM          Ordering Location:     [facility]   Received:            06/01/2024 12:22 PM                                 Main OR                                                                      Pathologist:           Charles Rene MD                                                                           Specimen:    Gallbladder with Stone(s), gallbladder and contents                                        Final Diagnosis 06/01/2024    Final                    Value:This result contains rich text formatting which cannot be displayed here.    Clinical Information 06/01/2024    Final                    Value:This result contains rich text formatting which cannot be displayed here.    Gross Description 06/01/2024    Final                    Value:This result contains rich text formatting which cannot be displayed here.    Microscopic Description 06/01/2024    Final                    Value:This result contains rich text formatting which cannot be displayed here.    Performing Labs 06/01/2024    Final                    Value:This result contains rich text formatting which cannot be displayed here.         Video-Visit Details    Type of service:  Video Visit   Originating Location (pt. Location):  Home  Distant Location (provider location):  On-site  Platform used for Video Visit: Honorio  Signed Electronically by: Tg Soni MD

## 2024-06-06 ENCOUNTER — LAB (OUTPATIENT)
Dept: LAB | Facility: CLINIC | Age: 49
End: 2024-06-06
Payer: COMMERCIAL

## 2024-06-06 DIAGNOSIS — R74.02 NONSPECIFIC ELEVATION OF LEVELS OF TRANSAMINASE AND LACTIC ACID DEHYDROGENASE (LDH): ICD-10-CM

## 2024-06-06 DIAGNOSIS — R74.01 NONSPECIFIC ELEVATION OF LEVELS OF TRANSAMINASE AND LACTIC ACID DEHYDROGENASE (LDH): ICD-10-CM

## 2024-06-06 DIAGNOSIS — Z90.49 HISTORY OF LAPAROSCOPIC CHOLECYSTECTOMY: ICD-10-CM

## 2024-06-06 LAB
ALBUMIN SERPL BCG-MCNC: 4.7 G/DL (ref 3.5–5.2)
ALP SERPL-CCNC: 119 U/L (ref 40–150)
ALT SERPL W P-5'-P-CCNC: 199 U/L (ref 0–50)
ANION GAP SERPL CALCULATED.3IONS-SCNC: 13 MMOL/L (ref 7–15)
AST SERPL W P-5'-P-CCNC: 35 U/L (ref 0–45)
BILIRUB SERPL-MCNC: 0.6 MG/DL
BUN SERPL-MCNC: 10.7 MG/DL (ref 6–20)
CALCIUM SERPL-MCNC: 9.6 MG/DL (ref 8.6–10)
CHLORIDE SERPL-SCNC: 100 MMOL/L (ref 98–107)
CREAT SERPL-MCNC: 0.82 MG/DL (ref 0.51–0.95)
DEPRECATED HCO3 PLAS-SCNC: 25 MMOL/L (ref 22–29)
EGFRCR SERPLBLD CKD-EPI 2021: 88 ML/MIN/1.73M2
GLUCOSE SERPL-MCNC: 92 MG/DL (ref 70–99)
POTASSIUM SERPL-SCNC: 5 MMOL/L (ref 3.4–5.3)
PROT SERPL-MCNC: 7.6 G/DL (ref 6.4–8.3)
SODIUM SERPL-SCNC: 138 MMOL/L (ref 135–145)

## 2024-06-06 PROCEDURE — 82040 ASSAY OF SERUM ALBUMIN: CPT

## 2024-06-06 PROCEDURE — 36415 COLL VENOUS BLD VENIPUNCTURE: CPT

## 2024-06-09 NOTE — RESULT ENCOUNTER NOTE
Dear Delia,   Your liver enzymes improved significantly.  Please schedule a lab only visit in 1-2 months to recheck your liver enzymes.    Best Regards  Tg Soni MD

## 2024-06-25 ENCOUNTER — TELEPHONE (OUTPATIENT)
Dept: SURGERY | Facility: CLINIC | Age: 49
End: 2024-06-25
Payer: COMMERCIAL

## 2024-06-25 NOTE — CONFIDENTIAL NOTE
Attempted to call patient for post op check.  No answer.  Message was left for patient to call back if they had any questions of concerns.     Funmi Tapia PA-C

## 2024-07-28 ENCOUNTER — LAB (OUTPATIENT)
Dept: LAB | Facility: CLINIC | Age: 49
End: 2024-07-28
Payer: COMMERCIAL

## 2024-07-28 DIAGNOSIS — R74.02 NONSPECIFIC ELEVATION OF LEVELS OF TRANSAMINASE AND LACTIC ACID DEHYDROGENASE (LDH): ICD-10-CM

## 2024-07-28 DIAGNOSIS — R74.01 NONSPECIFIC ELEVATION OF LEVELS OF TRANSAMINASE AND LACTIC ACID DEHYDROGENASE (LDH): ICD-10-CM

## 2024-07-28 LAB
ALBUMIN SERPL BCG-MCNC: 4.6 G/DL (ref 3.5–5.2)
ALP SERPL-CCNC: 77 U/L (ref 40–150)
ALT SERPL W P-5'-P-CCNC: 24 U/L (ref 0–50)
ANION GAP SERPL CALCULATED.3IONS-SCNC: 9 MMOL/L (ref 7–15)
AST SERPL W P-5'-P-CCNC: 18 U/L (ref 0–45)
BILIRUB SERPL-MCNC: 0.4 MG/DL
BUN SERPL-MCNC: 9.5 MG/DL (ref 6–20)
CALCIUM SERPL-MCNC: 9.6 MG/DL (ref 8.8–10.4)
CHLORIDE SERPL-SCNC: 99 MMOL/L (ref 98–107)
CREAT SERPL-MCNC: 0.83 MG/DL (ref 0.51–0.95)
EGFRCR SERPLBLD CKD-EPI 2021: 86 ML/MIN/1.73M2
GLUCOSE SERPL-MCNC: 94 MG/DL (ref 70–99)
HCO3 SERPL-SCNC: 28 MMOL/L (ref 22–29)
POTASSIUM SERPL-SCNC: 4.5 MMOL/L (ref 3.4–5.3)
PROT SERPL-MCNC: 7.1 G/DL (ref 6.4–8.3)
SODIUM SERPL-SCNC: 136 MMOL/L (ref 135–145)

## 2024-07-28 PROCEDURE — 80053 COMPREHEN METABOLIC PANEL: CPT

## 2024-07-28 PROCEDURE — 36415 COLL VENOUS BLD VENIPUNCTURE: CPT

## 2024-10-17 ENCOUNTER — HOSPITAL ENCOUNTER (OUTPATIENT)
Dept: MAMMOGRAPHY | Facility: CLINIC | Age: 49
Discharge: HOME | End: 2024-10-17
Attending: FAMILY MEDICINE | Admitting: FAMILY MEDICINE
Payer: COMMERCIAL

## 2024-10-17 DIAGNOSIS — Z12.31 VISIT FOR SCREENING MAMMOGRAM: ICD-10-CM

## 2024-10-17 PROCEDURE — 77063 BREAST TOMOSYNTHESIS BI: CPT

## 2024-11-01 ENCOUNTER — TRANSFERRED RECORDS (OUTPATIENT)
Dept: HEALTH INFORMATION MANAGEMENT | Facility: CLINIC | Age: 49
End: 2024-11-01
Payer: COMMERCIAL

## 2024-11-14 ENCOUNTER — OFFICE VISIT (OUTPATIENT)
Dept: URGENT CARE | Facility: URGENT CARE | Age: 49
End: 2024-11-14
Payer: COMMERCIAL

## 2024-11-14 VITALS
BODY MASS INDEX: 38.83 KG/M2 | WEIGHT: 240.6 LBS | SYSTOLIC BLOOD PRESSURE: 124 MMHG | DIASTOLIC BLOOD PRESSURE: 87 MMHG | OXYGEN SATURATION: 98 % | TEMPERATURE: 97 F | HEART RATE: 66 BPM | RESPIRATION RATE: 16 BRPM

## 2024-11-14 DIAGNOSIS — J06.9 VIRAL URI WITH COUGH: Primary | ICD-10-CM

## 2024-11-14 DIAGNOSIS — R07.0 THROAT PAIN: ICD-10-CM

## 2024-11-14 LAB
DEPRECATED S PYO AG THROAT QL EIA: NEGATIVE
GROUP A STREP BY PCR: NOT DETECTED

## 2024-11-14 PROCEDURE — 99213 OFFICE O/P EST LOW 20 MIN: CPT | Performed by: NURSE PRACTITIONER

## 2024-11-14 PROCEDURE — 87651 STREP A DNA AMP PROBE: CPT | Performed by: NURSE PRACTITIONER

## 2024-11-14 ASSESSMENT — ENCOUNTER SYMPTOMS
GASTROINTESTINAL NEGATIVE: 1
CARDIOVASCULAR NEGATIVE: 1
FEVER: 0
EYES NEGATIVE: 1
FATIGUE: 1
RHINORRHEA: 1
RESPIRATORY NEGATIVE: 1
SORE THROAT: 1

## 2024-11-14 NOTE — PROGRESS NOTES
Assessment & Plan       ICD-10-CM    1. Viral URI with cough  J06.9       2. Throat pain  R07.0 Streptococcus A Rapid Screen w/Reflex to PCR - Clinic Collect     Group A Streptococcus PCR Throat Swab             Patient Instructions   Strep (-), culture pending - will only call if positive    Flonase nasal spray one spray in each nostril twice daily for 14 days then once daily for the next 14 days after that    Increase fluids with water, Pedialyte, Gatorade, or rehydrating beverages.     Alternate Tylenol and Ibuprofen as needed for aches, pains or fever.     If needed, follow soft food diet. Rest as much as possible. Run humidifier at night. Gargle with hot salty water. Have warm tea or water with honey. Follow up in clinic if symptoms persist or worsen. This usually can last 7-10 days.       Follow up with any problems, questions or concerns or if symptoms worsen or fail to improve. Patient agreed to the treatment plan and verbalized understanding.     Results for orders placed or performed in visit on 11/14/24 (from the past 24 hours)   Streptococcus A Rapid Screen w/Reflex to PCR - Clinic Collect    Specimen: Throat; Swab   Result Value Ref Range    Group A Strep antigen Negative Negative       Subjective     Delia is a 49 year old female who presents to clinic today for the following health issues:  Chief Complaint   Patient presents with    Urgent Care    Pharyngitis     Seen at [facility] 2 weeks, neg rapid strep test  Exposure to strep from her kids. Pt reports fatigue x 3 weeks and sore throat for 2-3 days   Swollen lymph nodes in neck last couple days      HPI  Patient states that approximately 2 weeks ago she had a negative rapid strep test.  She states at that time both of her kids tested positive for strep.  She states her symptoms resolved but now in the last 3 days she has had a sore throat again with fatigue.  She feels that she has swollen lymph nodes in her anterior neck.  She states she has  had a slight cough due to postnasal drainage.  She denies any fevers, chest congestion, nausea, vomiting, diarrhea, or other systemic symptoms.  She did take 1 dose of ibuprofen approximately 2 days ago.      Review of Systems   Constitutional:  Positive for fatigue. Negative for fever.   HENT:  Positive for congestion, ear pain (right worse than left), rhinorrhea and sore throat.    Eyes: Negative.    Respiratory: Negative.     Cardiovascular: Negative.    Gastrointestinal: Negative.    Skin:  Negative for rash.       Problem List:  2024-06: Gallstones  2024-05: Acute cholecystitis  2024-05: Elevated LFTs  2023-02: Cervical high risk HPV (human papillomavirus) test positive  2012-08: Labor and delivery, indication for care  2012-08: Vaginal delivery  2012-07: GBS (group B Streptococcus carrier), +RV culture, currently   pregnant  2012-03: Encounter for supervision of other normal pregnancy  2011-06: CURLY III (vulvar intraepithelial neoplasia III)  2010-10: CARDIOVASCULAR SCREENING; LDL GOAL LESS THAN 160  2009-05: NO ACTIVE PROBLEMS  2008-10: RESEARCH PATIENT  2008-08: Supervision of normal first pregnancy  Anal intraepithelial neoplasia II (AIN II)      Past Medical History:   Diagnosis Date    Anal intraepithelial neoplasia II (AIN II) 06/01/2011    dr Escudero/ [facility] 1 on repeat biopsy--hold off on aldara until after pregnancy    Fibrocystic breast     Obesity     CURLY III (vulvar intraepithelial neoplasia III) 01/01/2002    [facility] since         Social History     Tobacco Use    Smoking status: Never    Smokeless tobacco: Never   Substance Use Topics    Alcohol use: Yes     Alcohol/week: 0.0 standard drinks of alcohol     Comment: wine on weekends           Objective    /87 (BP Location: Right arm, Patient Position: Sitting, Cuff Size: Adult Large)   Pulse 66   Temp 97  F (36.1  C) (Tympanic)   Resp 16   Wt 109.1 kg (240 lb 9.6 oz)   SpO2 98%   Breastfeeding No   BMI 38.83 kg/m    Physical Exam  Vitals and  nursing note reviewed.   Constitutional:       General: She is not in acute distress.     Appearance: Normal appearance. She is not ill-appearing.   HENT:      Head: Normocephalic and atraumatic.      Right Ear: Tympanic membrane and ear canal normal.      Left Ear: Tympanic membrane and ear canal normal.      Ears:      Comments: Clear fluid behind the TMs, bilaterally.     Nose: Rhinorrhea (Clear) present.      Mouth/Throat:      Mouth: Mucous membranes are moist.      Pharynx: Oropharynx is clear. Posterior oropharyngeal erythema present. No oropharyngeal exudate.   Eyes:      Extraocular Movements: Extraocular movements intact.      Conjunctiva/sclera: Conjunctivae normal.      Pupils: Pupils are equal, round, and reactive to light.   Cardiovascular:      Rate and Rhythm: Normal rate and regular rhythm.      Heart sounds: Normal heart sounds.   Pulmonary:      Effort: Pulmonary effort is normal.      Breath sounds: Normal breath sounds.   Musculoskeletal:      Cervical back: Normal range of motion and neck supple.   Lymphadenopathy:      Cervical: Cervical adenopathy (Mild anterior) present.   Neurological:      Mental Status: She is alert and oriented to person, place, and time.              Estefany Sandhu NP

## 2024-11-14 NOTE — PATIENT INSTRUCTIONS
Strep (-), culture pending - will only call if positive    Flonase nasal spray one spray in each nostril twice daily for 14 days then once daily for the next 14 days after that    Increase fluids with water, Pedialyte, Gatorade, or rehydrating beverages.     Alternate Tylenol and Ibuprofen as needed for aches, pains or fever.     If needed, follow soft food diet. Rest as much as possible. Run humidifier at night. Gargle with hot salty water. Have warm tea or water with honey. Follow up in clinic if symptoms persist or worsen. This usually can last 7-10 days.

## 2025-04-19 ENCOUNTER — HEALTH MAINTENANCE LETTER (OUTPATIENT)
Age: 50
End: 2025-04-19

## 2025-05-28 SDOH — HEALTH STABILITY: PHYSICAL HEALTH: ON AVERAGE, HOW MANY MINUTES DO YOU ENGAGE IN EXERCISE AT THIS LEVEL?: 30 MIN

## 2025-05-28 SDOH — HEALTH STABILITY: PHYSICAL HEALTH: ON AVERAGE, HOW MANY DAYS PER WEEK DO YOU ENGAGE IN MODERATE TO STRENUOUS EXERCISE (LIKE A BRISK WALK)?: 3 DAYS

## 2025-05-28 ASSESSMENT — SOCIAL DETERMINANTS OF HEALTH (SDOH): HOW OFTEN DO YOU GET TOGETHER WITH FRIENDS OR RELATIVES?: MORE THAN THREE TIMES A WEEK

## 2025-06-02 ENCOUNTER — OFFICE VISIT (OUTPATIENT)
Dept: FAMILY MEDICINE | Facility: CLINIC | Age: 50
End: 2025-06-02
Payer: COMMERCIAL

## 2025-06-02 VITALS
DIASTOLIC BLOOD PRESSURE: 79 MMHG | BODY MASS INDEX: 38.41 KG/M2 | SYSTOLIC BLOOD PRESSURE: 114 MMHG | RESPIRATION RATE: 16 BRPM | HEART RATE: 69 BPM | HEIGHT: 66 IN | WEIGHT: 239 LBS | TEMPERATURE: 97.3 F | OXYGEN SATURATION: 97 %

## 2025-06-02 DIAGNOSIS — Z12.4 CERVICAL CANCER SCREENING: ICD-10-CM

## 2025-06-02 DIAGNOSIS — Z00.00 ROUTINE GENERAL MEDICAL EXAMINATION AT A HEALTH CARE FACILITY: Primary | ICD-10-CM

## 2025-06-02 DIAGNOSIS — Z78.0 POSTMENOPAUSAL: ICD-10-CM

## 2025-06-02 DIAGNOSIS — Z13.6 SCREENING FOR CARDIOVASCULAR CONDITION: ICD-10-CM

## 2025-06-02 LAB
ERYTHROCYTE [DISTWIDTH] IN BLOOD BY AUTOMATED COUNT: 12.4 % (ref 10–15)
EST. AVERAGE GLUCOSE BLD GHB EST-MCNC: 111 MG/DL
HBA1C MFR BLD: 5.5 % (ref 0–5.6)
HCT VFR BLD AUTO: 41.3 % (ref 35–47)
HGB BLD-MCNC: 14.1 G/DL (ref 11.7–15.7)
MCH RBC QN AUTO: 29.4 PG (ref 26.5–33)
MCHC RBC AUTO-ENTMCNC: 34.1 G/DL (ref 31.5–36.5)
MCV RBC AUTO: 86 FL (ref 78–100)
PLATELET # BLD AUTO: 294 10E3/UL (ref 150–450)
RBC # BLD AUTO: 4.8 10E6/UL (ref 3.8–5.2)
WBC # BLD AUTO: 7.2 10E3/UL (ref 4–11)

## 2025-06-02 PROCEDURE — 3078F DIAST BP <80 MM HG: CPT | Performed by: FAMILY MEDICINE

## 2025-06-02 PROCEDURE — 99396 PREV VISIT EST AGE 40-64: CPT | Performed by: FAMILY MEDICINE

## 2025-06-02 PROCEDURE — 36415 COLL VENOUS BLD VENIPUNCTURE: CPT | Performed by: FAMILY MEDICINE

## 2025-06-02 PROCEDURE — 80053 COMPREHEN METABOLIC PANEL: CPT | Performed by: FAMILY MEDICINE

## 2025-06-02 PROCEDURE — 82670 ASSAY OF TOTAL ESTRADIOL: CPT | Performed by: FAMILY MEDICINE

## 2025-06-02 PROCEDURE — 87624 HPV HI-RISK TYP POOLED RSLT: CPT | Performed by: FAMILY MEDICINE

## 2025-06-02 PROCEDURE — G0145 SCR C/V CYTO,THINLAYER,RESCR: HCPCS | Performed by: FAMILY MEDICINE

## 2025-06-02 PROCEDURE — 80061 LIPID PANEL: CPT | Performed by: FAMILY MEDICINE

## 2025-06-02 PROCEDURE — 1126F AMNT PAIN NOTED NONE PRSNT: CPT | Performed by: FAMILY MEDICINE

## 2025-06-02 PROCEDURE — 85027 COMPLETE CBC AUTOMATED: CPT | Performed by: FAMILY MEDICINE

## 2025-06-02 PROCEDURE — 84443 ASSAY THYROID STIM HORMONE: CPT | Performed by: FAMILY MEDICINE

## 2025-06-02 PROCEDURE — 99213 OFFICE O/P EST LOW 20 MIN: CPT | Mod: 25 | Performed by: FAMILY MEDICINE

## 2025-06-02 PROCEDURE — 83001 ASSAY OF GONADOTROPIN (FSH): CPT | Performed by: FAMILY MEDICINE

## 2025-06-02 PROCEDURE — 83036 HEMOGLOBIN GLYCOSYLATED A1C: CPT | Performed by: FAMILY MEDICINE

## 2025-06-02 PROCEDURE — 3074F SYST BP LT 130 MM HG: CPT | Performed by: FAMILY MEDICINE

## 2025-06-02 ASSESSMENT — PAIN SCALES - GENERAL: PAINLEVEL_OUTOF10: NO PAIN (0)

## 2025-06-02 NOTE — PROGRESS NOTES
Preventive Care Visit  [facility]  Tg Soni MD, Family Medicine  Jun 2, 2025  {Provider  Link to [facility] :380060}    {PROVIDER CHARTING PREFERENCE:277438}    Alexa Lin is a 50 year old, presenting for the following:  Physical        6/2/2025    10:36 AM   Additional Questions   Roomed by Dixon DENTON        HPI  ***   {MA/LPN/RN Pre-Provider Visit Orders- hCG/UA/Strep (Optional):839865}  {SUPERLIST (Optional):015549}  {additonal problems for provider to add (Optional):026764}  Advance Care Planning  {The storyboard will display whether the patient has ACP docs on file. Hover over the Code section in the storyboard to access the ACP documents. :638046}  {(AWV REQUIRED) Advance Care Planning Reviewed:438588}        5/28/2025   General Health   How would you rate your overall physical health? Good   Feel stress (tense, anxious, or unable to sleep) To some extent   (!) STRESS CONCERN      5/28/2025   Nutrition   Three or more servings of calcium each day? Yes   Diet: Regular (no restrictions)   How many servings of fruit and vegetables per day? 4 or more   How many sweetened beverages each day? 0-1         5/28/2025   Exercise   Days per week of moderate/strenous exercise 3 days   Average minutes spent exercising at this level 30 min         5/28/2025   Social Factors   Frequency of gathering with friends or relatives More than three times a week   Worry food won't last until get money to buy more No   Food not last or not have enough money for food? No   Do you have housing? (Housing is defined as stable permanent housing and does not include staying outside in a car, in a tent, in an abandoned building, in an overnight shelter, or couch-surfing.) Yes   Are you worried about losing your housing? No   Lack of transportation? No   Unable to get utilities (heat,electricity)? No         5/28/2025   Dental   Dentist two times every year? Yes         Today's PHQ-2 Score:       6/2/2025     10:33 AM   PHQ-2 ( 1999 Pf[employer])   Q1: Little interest or pleasure in doing things 0   Q2: Feeling down, depressed or hopeless 0   PHQ-2 Score 0    Q1: Little interest or pleasure in doing things Not at all   Q2: Feeling down, depressed or hopeless Not at all   PHQ-2 Score 0       Patient-reported           5/28/2025   Substance Use   Alcohol more than 3/day or more than 7/wk No   Do you use any other substances recreationally? No     Social History     Tobacco Use    Smoking status: Never    Smokeless tobacco: Never   Vaping Use    Vaping status: Never Used   Substance Use Topics    Alcohol use: Yes     Comment: wine on weekends    Drug use: No     {Provider  If there are gaps in the social history shown above, please follow the link to update and then refresh the note Link to Social and Substance History :079324}      10/17/2024   LAST FHS-7 RESULTS   1st degree relative breast or ovarian cancer No   Any relative bilateral breast cancer No   Any male have breast cancer No   Any ONE woman have BOTH breast AND ovarian cancer No   Any woman with breast cancer before 50yrs Yes   2 or more relatives with breast AND/OR ovarian cancer No   2 or more relatives with breast AND/OR bowel cancer No     {If any of the questions to the FHS7 are answered yes, consider referral for genetic counseling.    Additional indications for genetic referral include personal history of breast or ovarian cancer, genetic mutation in 1st degree relative which increases risk of breast cancer including BRCA1, BRCA2, MIKE, PALB 2, TP53, CHEK2, PTEN, CDH1, STK11 (per ACS) and/or 1st degree relative with history of pancreatic or high-risk prostate cancer (per NCCN):193233}   {Mammogram Decision Support (Optional):005582}        5/28/2025   STI Screening   New sexual partner(s) since last STI/HIV test? No     History of abnormal Pap smear: { :234896}        Latest Ref Rng & Units 2/22/2023    11:54 AM 12/17/2018    11:20 AM 12/17/2018    11:15 AM  "  PAP / HPV   PAP  Negative for Intraepithelial Lesion or Malignancy (NILM)      PAP (Historical)    NIL    HPV 16 DNA Negative Negative  Negative     HPV 18 DNA Negative Negative  Negative     Other HR HPV Negative Positive  Negative       ASCVD Risk   The 10-year ASCVD risk score (Ariadna MERRILL, et al., 2019) is: 2.8%    Values used to calculate the score:      Age: 50 years      Sex: Female      Is Non- : No      Diabetic: No      Tobacco smoker: No      Systolic Blood Pressure: 124 mmHg      Is BP treated: No      HDL Cholesterol: 37 mg/dL      Total Cholesterol: 278 mg/dL        5/28/2025   Contraception/Family Planning   Questions about contraception or family planning No     {Provider  REQUIRED FOR AWV Use the storyboard to review patient history, after sections have been marked as reviewed, refresh note to capture documentation:527422}   Reviewed and updated as needed this visit by Provider                    {HISTORY OPTIONS (Optional):829770}    {ROS Picklists (Optional):602102}     Objective    Exam  There were no vitals taken for this visit.   Estimated body mass index is 38.83 kg/m  as calculated from the following:    Height as of 6/1/24: 1.676 m (5' 6\").    Weight as of 11/14/24: 109.1 kg (240 lb 9.6 oz).    Physical Exam  {Exam Choices (Optional):992771}        Signed Electronically by: Tg Soni MD  {Email feedback regarding this note to [email or web address]   :190656}  " every year? Yes         Today's PHQ-2 Score:       6/2/2025    10:33 AM   PHQ-2 ( 1999 Pfizer)   Q1: Little interest or pleasure in doing things 0   Q2: Feeling down, depressed or hopeless 0   PHQ-2 Score 0    Q1: Little interest or pleasure in doing things Not at all   Q2: Feeling down, depressed or hopeless Not at all   PHQ-2 Score 0       Patient-reported           5/28/2025   Substance Use   Alcohol more than 3/day or more than 7/wk No   Do you use any other substances recreationally? No     Social History     Tobacco Use    Smoking status: Never    Smokeless tobacco: Never   Vaping Use    Vaping status: Never Used   Substance Use Topics    Alcohol use: Yes     Comment: wine on weekends    Drug use: No           10/17/2024   LAST FHS-7 RESULTS   1st degree relative breast or ovarian cancer No   Any relative bilateral breast cancer No   Any male have breast cancer No   Any ONE woman have BOTH breast AND ovarian cancer No   Any woman with breast cancer before 50yrs Yes   2 or more relatives with breast AND/OR ovarian cancer No   2 or more relatives with breast AND/OR bowel cancer No        Mammogram Screening - Mammogram every 1-2 years updated in Health Maintenance based on mutual decision making        5/28/2025   STI Screening   New sexual partner(s) since last STI/HIV test? No     History of abnormal Pap smear: No - age 30- 64 PAP with HPV every 5 years recommended        Latest Ref Rng & Units 6/2/2025    11:38 AM 2/22/2023    11:54 AM 12/17/2018    11:20 AM   PAP / HPV   PAP  Negative for Intraepithelial Lesion or Malignancy (NILM)  Negative for Intraepithelial Lesion or Malignancy (NILM)     HPV 16 DNA Negative Negative  Negative  Negative    HPV 18 DNA Negative Negative  Negative  Negative    Other HR HPV Negative Negative  Positive  Negative      ASCVD Risk   The 10-year ASCVD risk score (Ariadna MERRILL, et al., 2019) is: 2%    Values used to calculate the score:      Age: 50 years      Sex: Female     "  Is Non- : No      Diabetic: No      Tobacco smoker: No      Systolic Blood Pressure: 114 mmHg      Is BP treated: No      HDL Cholesterol: 37 mg/dL      Total Cholesterol: 245 mg/dL        5/28/2025   Contraception/Family Planning   Questions about contraception or family planning No        Reviewed and updated as needed this visit by Provider   Tobacco  Allergies  Meds  Problems  Med Hx  Surg Hx  Fam Hx            Current Outpatient Medications   Medication Sig Dispense Refill    cetirizine (ZYRTEC) 10 MG tablet Take 10 mg by mouth daily as needed for allergies      estradiol-norethindrone (COMBIPATCH) 0.05-0.14 MG/DAY bi-weekly patch Place 1 patch over 96 hours onto the skin twice a week. 8 patch 3         Review of Systems  Constitutional, neuro, ENT, endocrine, pulmonary, cardiac, gastrointestinal, genitourinary, musculoskeletal, integument and psychiatric systems are negative, except as otherwise noted.     Objective    Exam  /79   Pulse 69   Temp 97.3  F (36.3  C) (Temporal)   Resp 16   Ht 1.664 m (5' 5.5\")   Wt 108.4 kg (239 lb)   LMP 04/05/2025 (Within Days)   SpO2 97%   BMI 39.17 kg/m     Estimated body mass index is 39.17 kg/m  as calculated from the following:    Height as of this encounter: 1.664 m (5' 5.5\").    Weight as of this encounter: 108.4 kg (239 lb).    Physical Exam  GENERAL: alert and no distress  EYES: Eyes grossly normal to inspection, PERRL and conjunctivae and sclerae normal  HENT: ear canals and TM's normal, nose and mouth without ulcers or lesions  NECK: no adenopathy, no asymmetry, masses, or scars  RESP: lungs clear to auscultation - no rales, rhonchi or wheezes  CV: regular rate and rhythm, normal S1 S2, no S3 or S4, no murmur, click or rub, no peripheral edema  ABDOMEN: soft, nontender, no hepatosplenomegaly, no masses and bowel sounds normal  MS: no gross musculoskeletal defects noted, no edema  SKIN: no suspicious lesions or " saritha  NEURO: Normal strength and tone, mentation intact and speech normal  PSYCH: mentation appears normal, affect normal/bright        Signed Electronically by: Tg Soni MD

## 2025-06-02 NOTE — PATIENT INSTRUCTIONS
Patient Education   Preventive Care Advice   This is general advice given by our system to help you stay healthy. However, your care team may have specific advice just for you. Please talk to your care team about your preventive care needs.  Nutrition  Eat 5 or more servings of fruits and vegetables each day.  Try wheat bread, brown rice and whole grain pasta (instead of white bread, rice, and pasta).  Get enough calcium and vitamin D. Check the label on foods and aim for 100% of the RDA (recommended daily allowance).  Lifestyle  Exercise at least 150 minutes each week  (30 minutes a day, 5 days a week).  Do muscle strengthening activities 2 days a week. These help control your weight and prevent disease.  No smoking.  Wear sunscreen to prevent skin cancer.  Have a dental exam and cleaning every 6 months.  Yearly exams  See your health care team every year to talk about:  Any changes in your health.  Any medicines your care team has prescribed.  Preventive care, family planning, and ways to prevent chronic diseases.  Shots (vaccines)   HPV shots (up to age 26), if you've never had them before.  Hepatitis B shots (up to age 59), if you've never had them before.  COVID-19 shot: Get this shot when it's due.  Flu shot: Get a flu shot every year.  Tetanus shot: Get a tetanus shot every 10 years.  Pneumococcal, hepatitis A, and RSV shots: Ask your care team if you need these based on your risk.  Shingles shot (for age 50 and up)  General health tests  Diabetes screening:  Starting at age 35, Get screened for diabetes at least every 3 years.  If you are younger than age 35, ask your care team if you should be screened for diabetes.  Cholesterol test: At age 39, start having a cholesterol test every 5 years, or more often if advised.  Bone density scan (DEXA): At age 50, ask your care team if you should have this scan for osteoporosis (brittle bones).  Hepatitis C: Get tested at least once in your life.  STIs (sexually  transmitted infections)  Before age 24: Ask your care team if you should be screened for STIs.  After age 24: Get screened for STIs if you're at risk. You are at risk for STIs (including HIV) if:  You are sexually active with more than one person.  You don't use condoms every time.  You or a partner was diagnosed with a sexually transmitted infection.  If you are at risk for HIV, ask about PrEP medicine to prevent HIV.  Get tested for HIV at least once in your life, whether you are at risk for HIV or not.  Cancer screening tests  Cervical cancer screening: If you have a cervix, begin getting regular cervical cancer screening tests starting at age 21.  Breast cancer scan (mammogram): If you've ever had breasts, begin having regular mammograms starting at age 40. This is a scan to check for breast cancer.  Colon cancer screening: It is important to start screening for colon cancer at age 45.  Have a colonoscopy test every 10 years (or more often if you're at risk) Or, ask your provider about stool tests like a FIT test every year or Cologuard test every 3 years.  To learn more about your testing options, visit:   .  For help making a decision, visit:   https://bit.ly/lw42453.  Prostate cancer screening test: If you have a prostate, ask your care team if a prostate cancer screening test (PSA) at age 55 is right for you.  Lung cancer screening: If you are a current or former smoker ages 50 to 80, ask your care team if ongoing lung cancer screenings are right for you.  For informational purposes only. Not to replace the advice of your health care provider. Copyright   2023 [facility]. All rights reserved. Clinically reviewed by the [facility]. [employer] 902939 - REV 01/24.  Learning About Stress  What is stress?     Stress is your body's response to a hard situation. Your body can have a physical, emotional, or mental response. Stress is a fact of life for most people, and it  affects everyone differently. What causes stress for you may not be stressful for someone else.  A lot of things can cause stress. You may feel stress when you go on a job interview, take a test, or run a race. This kind of short-term stress is normal and even useful. It can help you if you need to work hard or react quickly. For example, stress can help you finish an important job on time.  Long-term stress is caused by ongoing stressful situations or events. Examples of long-term stress include long-term health problems, ongoing problems at work, or conflicts in your family. Long-term stress can harm your health.  How does stress affect your health?  When you are stressed, your body responds as though you are in danger. It makes hormones that speed up your heart, make you breathe faster, and give you a burst of energy. This is called the fight-or-flight stress response. If the stress is over quickly, your body goes back to normal and no harm is done.  But if stress happens too often or lasts too long, it can have bad effects. Long-term stress can make you more likely to get sick, and it can make symptoms of some diseases worse. If you tense up when you are stressed, you may develop neck, shoulder, or low back pain. Stress is linked to high blood pressure and heart disease.  Stress also harms your emotional health. It can make you beltran, tense, or depressed. Your relationships may suffer, and you may not do well at work or school.  What can you do to manage stress?  You can try these things to help manage stress:   Do something active. Exercise or activity can help reduce stress. Walking is a great way to get started. Even everyday activities such as housecleaning or yard work can help.  Try yoga or luis enrique chi. These techniques combine exercise and meditation. You may need some training at first to learn them.  Do something you enjoy. For example, listen to music or go to a movie. Practice your hobby or do volunteer  "work.  Meditate. This can help you relax, because you are not worrying about what happened before or what may happen in the future.  Do guided imagery. Imagine yourself in any setting that helps you feel calm. You can use online videos, books, or a teacher to guide you.  Do breathing exercises. For example:  From a standing position, bend forward from the waist with your knees slightly bent. Let your arms dangle close to the floor.  Breathe in slowly and deeply as you return to a standing position. Roll up slowly and lift your head last.  Hold your breath for just a few seconds in the standing position.  Breathe out slowly and bend forward from the waist.  Let your feelings out. Talk, laugh, cry, and express anger when you need to. Talking with supportive friends or family, a counselor, or a kar leader about your feelings is a healthy way to relieve stress. Avoid discussing your feelings with people who make you feel worse.  Write. It may help to write about things that are bothering you. This helps you find out how much stress you feel and what is causing it. When you know this, you can find better ways to cope.  What can you do to prevent stress?  You might try some of these things to help prevent stress:  Manage your time. This helps you find time to do the things you want and need to do.  Get enough sleep. Your body recovers from the stresses of the day while you are sleeping.  Get support. Your family, friends, and community can make a difference in how you experience stress.  Limit your news feed. Avoid or limit time on social media or news that may make you feel stressed.  Do something active. Exercise or activity can help reduce stress. Walking is a great way to get started.  Where can you learn more?  Go to https://www.[employer].net/patiented  Enter N032 in the search box to learn more about \"Learning About Stress.\"  Current as of: October 24, 2024  Content Version: 14.4 2024-2025 [employer].   Care instructions adapted under license by your healthcare professional. If you have questions about a medical condition or this instruction, always ask your healthcare professional. [employer] disclaims any warranty or liability for your use of this information.

## 2025-06-03 LAB
ALBUMIN SERPL BCG-MCNC: 4.6 G/DL (ref 3.5–5.2)
ALP SERPL-CCNC: 71 U/L (ref 40–150)
ALT SERPL W P-5'-P-CCNC: 34 U/L (ref 0–50)
ANION GAP SERPL CALCULATED.3IONS-SCNC: 12 MMOL/L (ref 7–15)
AST SERPL W P-5'-P-CCNC: 32 U/L (ref 0–45)
BILIRUB SERPL-MCNC: 0.5 MG/DL
BUN SERPL-MCNC: 12.6 MG/DL (ref 6–20)
CALCIUM SERPL-MCNC: 9.5 MG/DL (ref 8.8–10.4)
CHLORIDE SERPL-SCNC: 107 MMOL/L (ref 98–107)
CHOLEST SERPL-MCNC: 245 MG/DL
CREAT SERPL-MCNC: 0.87 MG/DL (ref 0.51–0.95)
EGFRCR SERPLBLD CKD-EPI 2021: 81 ML/MIN/1.73M2
ESTRADIOL SERPL-MCNC: 15 PG/ML
FASTING STATUS PATIENT QL REPORTED: YES
FASTING STATUS PATIENT QL REPORTED: YES
FSH SERPL IRP2-ACNC: 56.8 MIU/ML
GLUCOSE SERPL-MCNC: 95 MG/DL (ref 70–99)
HCO3 SERPL-SCNC: 22 MMOL/L (ref 22–29)
HDLC SERPL-MCNC: 37 MG/DL
HPV HR 12 DNA CVX QL NAA+PROBE: NEGATIVE
HPV16 DNA CVX QL NAA+PROBE: NEGATIVE
HPV18 DNA CVX QL NAA+PROBE: NEGATIVE
HUMAN PAPILLOMA VIRUS FINAL DIAGNOSIS: NORMAL
LDLC SERPL CALC-MCNC: 171 MG/DL
NONHDLC SERPL-MCNC: 208 MG/DL
POTASSIUM SERPL-SCNC: 4.5 MMOL/L (ref 3.4–5.3)
PROT SERPL-MCNC: 7.2 G/DL (ref 6.4–8.3)
SODIUM SERPL-SCNC: 141 MMOL/L (ref 135–145)
TRIGL SERPL-MCNC: 187 MG/DL
TSH SERPL DL<=0.005 MIU/L-ACNC: 1.12 UIU/ML (ref 0.3–4.2)

## 2025-06-04 ENCOUNTER — RESULTS FOLLOW-UP (OUTPATIENT)
Dept: FAMILY MEDICINE | Facility: CLINIC | Age: 50
End: 2025-06-04

## 2025-06-04 DIAGNOSIS — E78.5 HYPERLIPIDEMIA LDL GOAL <100: Primary | ICD-10-CM

## 2025-06-05 LAB
BKR AP ASSOCIATED HPV REPORT: NORMAL
BKR LAB AP GYN ADEQUACY: NORMAL
BKR LAB AP GYN INTERPRETATION: NORMAL
BKR LAB AP LMP: NORMAL
BKR LAB AP PREVIOUS ABNORMAL: NORMAL
PATH REPORT.COMMENTS IMP SPEC: NORMAL
PATH REPORT.COMMENTS IMP SPEC: NORMAL
PATH REPORT.RELEVANT HX SPEC: NORMAL

## 2025-07-22 ENCOUNTER — MYC MEDICAL ADVICE (OUTPATIENT)
Dept: FAMILY MEDICINE | Facility: CLINIC | Age: 50
End: 2025-07-22
Payer: COMMERCIAL

## 2025-07-22 DIAGNOSIS — Z78.0 POSTMENOPAUSAL: Primary | ICD-10-CM

## 2025-07-23 RX ORDER — ESTRADIOL 0.05 MG/D
1 PATCH, EXTENDED RELEASE TRANSDERMAL
Qty: 16 PATCH | Refills: 2 | Status: SHIPPED | OUTPATIENT
Start: 2025-07-24

## 2025-07-23 RX ORDER — PROGESTERONE 100 MG/1
100 CAPSULE ORAL DAILY
Qty: 90 CAPSULE | Refills: 0 | Status: SHIPPED | OUTPATIENT
Start: 2025-07-23

## 2025-07-23 NOTE — TELEPHONE ENCOUNTER
I canceled the old prescription and sent new prescriptions to her pharmacy.   Advise her to use a coupon from [employer].   MD Nae

## 2025-08-11 ENCOUNTER — E-VISIT (OUTPATIENT)
Dept: URGENT CARE | Facility: CLINIC | Age: 50
End: 2025-08-11
Payer: COMMERCIAL

## 2025-08-11 DIAGNOSIS — R21 RASH: Primary | ICD-10-CM

## 2025-08-11 PROCEDURE — 99207 PR NON-BILLABLE SERV PER CHARTING: CPT | Performed by: NURSE PRACTITIONER

## 2025-09-01 ENCOUNTER — PATIENT OUTREACH (OUTPATIENT)
Dept: CARE COORDINATION | Facility: CLINIC | Age: 50
End: 2025-09-01
Payer: COMMERCIAL

## (undated) DEVICE — ENDO TROCAR BLUNT TIP KII BALLOON 12X100MM C0R47

## (undated) DEVICE — ENDO POUCH UNIV RETRIEVAL SYSTEM INZII 10MM CD001

## (undated) DEVICE — ENDO TROCAR SLEEVE KII Z-THREADED 05X100MM CTS02

## (undated) DEVICE — RAD RX ISOVUE 300 (50ML) 61% IOPAMIDOL CHARGE PER ML

## (undated) DEVICE — LINEN POUCH DBL 5427

## (undated) DEVICE — GLOVE BIOGEL PI MICRO INDICATOR UNDERGLOVE SZ 8.0 48980

## (undated) DEVICE — GLOVE BIOGEL PI MICRO SZ 7.5 48575

## (undated) DEVICE — ESU CORD MONOPOLAR 10'  E0510

## (undated) DEVICE — DRAPE LEGGINGS 8421

## (undated) DEVICE — SOL NACL 0.9% INJ 250ML BAG 2B1322Q

## (undated) DEVICE — MANIFOLD NEPTUNE 4 PORT 700-20

## (undated) DEVICE — SU VICRYL 0 UR-6 27" J603H

## (undated) DEVICE — CATH CHOLANGIOGRAM KUMAR CC-019

## (undated) DEVICE — SOL NACL 0.9% IRRIG 1000ML BOTTLE 2F7124

## (undated) DEVICE — SOL NACL 0.9% IRRIG 3000ML BAG 2B7477

## (undated) DEVICE — SU DERMABOND ADVANCED .7ML DNX12

## (undated) DEVICE — PREP CHLORAPREP 26ML TINTED HI-LITE ORANGE 930815

## (undated) DEVICE — DRAPE C-ARM 60X42" 1013

## (undated) DEVICE — DECANTER BAG 2002S

## (undated) DEVICE — CLIP APPLIER ENDO 5MM M/L LIGAMAX EL5ML

## (undated) DEVICE — Device

## (undated) DEVICE — ENDO TROCAR FIRST ENTRY KII FIOS ADV FIX 05X100MM CFF03

## (undated) DEVICE — BAG CLEAR TRASH 1.3M 39X33" P4040C

## (undated) DEVICE — ESU GROUND PAD ADULT W/CORD E7507

## (undated) DEVICE — SU VICRYL 4-0 PS-2 18" UND J496H

## (undated) DEVICE — LINEN TOWEL PACK X10 5473

## (undated) DEVICE — SUCTION IRR STRYKERFLOW II W/TIP 250-070-520

## (undated) DEVICE — ENDO SPONGE ENDOSTICK KITTNER 5MM CHERRY 37002010

## (undated) DEVICE — SYR 30ML LL W/O NDL 302832

## (undated) DEVICE — LINEN FULL SHEET 5511

## (undated) DEVICE — BLADE KNIFE SURG 11 371111

## (undated) DEVICE — LINEN HALF SHEET 5512

## (undated) RX ORDER — FENTANYL CITRATE 50 UG/ML
INJECTION, SOLUTION INTRAMUSCULAR; INTRAVENOUS
Status: DISPENSED
Start: 2024-06-01

## (undated) RX ORDER — BUPIVACAINE HYDROCHLORIDE 5 MG/ML
INJECTION, SOLUTION EPIDURAL; INTRACAUDAL
Status: DISPENSED
Start: 2024-06-01

## (undated) RX ORDER — LIDOCAINE HYDROCHLORIDE 10 MG/ML
INJECTION, SOLUTION EPIDURAL; INFILTRATION; INTRACAUDAL; PERINEURAL
Status: DISPENSED
Start: 2024-06-01

## (undated) RX ORDER — CEFAZOLIN SODIUM/WATER 2 G/20 ML
SYRINGE (ML) INTRAVENOUS
Status: DISPENSED
Start: 2024-06-01

## (undated) RX ORDER — PROMETHAZINE HYDROCHLORIDE 25 MG/ML
INJECTION, SOLUTION INTRAMUSCULAR; INTRAVENOUS
Status: DISPENSED
Start: 2024-06-01

## (undated) RX ORDER — ONDANSETRON 2 MG/ML
INJECTION INTRAMUSCULAR; INTRAVENOUS
Status: DISPENSED
Start: 2024-06-01

## (undated) RX ORDER — PROPOFOL 10 MG/ML
INJECTION, EMULSION INTRAVENOUS
Status: DISPENSED
Start: 2024-06-01

## (undated) RX ORDER — EPHEDRINE SULFATE 50 MG/ML
INJECTION, SOLUTION INTRAVENOUS
Status: DISPENSED
Start: 2024-06-01

## (undated) RX ORDER — DEXAMETHASONE SODIUM PHOSPHATE 4 MG/ML
INJECTION, SOLUTION INTRA-ARTICULAR; INTRALESIONAL; INTRAMUSCULAR; INTRAVENOUS; SOFT TISSUE
Status: DISPENSED
Start: 2024-06-01

## (undated) RX ORDER — LABETALOL HYDROCHLORIDE 5 MG/ML
INJECTION, SOLUTION INTRAVENOUS
Status: DISPENSED
Start: 2024-06-01

## (undated) RX ORDER — GLYCOPYRROLATE 0.2 MG/ML
INJECTION, SOLUTION INTRAMUSCULAR; INTRAVENOUS
Status: DISPENSED
Start: 2024-06-01

## (undated) RX ORDER — INDOCYANINE GREEN AND WATER 25 MG
KIT INJECTION
Status: DISPENSED
Start: 2024-06-01